# Patient Record
Sex: MALE | Race: BLACK OR AFRICAN AMERICAN | Employment: FULL TIME | URBAN - METROPOLITAN AREA
[De-identification: names, ages, dates, MRNs, and addresses within clinical notes are randomized per-mention and may not be internally consistent; named-entity substitution may affect disease eponyms.]

---

## 2018-03-17 ENCOUNTER — HOSPITAL ENCOUNTER (EMERGENCY)
Age: 23
Discharge: HOME | End: 2018-03-17
Attending: EMERGENCY MEDICINE

## 2018-03-17 VITALS
HEIGHT: 78 IN | HEART RATE: 115 BPM | OXYGEN SATURATION: 96 % | DIASTOLIC BLOOD PRESSURE: 96 MMHG | RESPIRATION RATE: 16 BRPM | SYSTOLIC BLOOD PRESSURE: 159 MMHG | BODY MASS INDEX: 31.24 KG/M2 | WEIGHT: 270 LBS | TEMPERATURE: 98.2 F

## 2018-03-17 DIAGNOSIS — L73.2 HIDRADENITIS SUPPURATIVA: Primary | ICD-10-CM

## 2018-03-17 PROCEDURE — 99283 EMERGENCY DEPT VISIT LOW MDM: CPT

## 2018-03-17 PROCEDURE — 87205 SMEAR GRAM STAIN: CPT

## 2018-03-17 PROCEDURE — 87070 CULTURE OTHR SPECIMN AEROBIC: CPT

## 2018-03-17 RX ORDER — DOXYCYCLINE 100 MG/1
100 TABLET ORAL 2 TIMES DAILY
Qty: 28 TABLET | Refills: 0 | Status: SHIPPED | OUTPATIENT
Start: 2018-03-17 | End: 2018-03-31

## 2018-03-17 ASSESSMENT — PAIN SCALES - GENERAL: PAINLEVEL_OUTOF10: 7

## 2018-03-17 ASSESSMENT — ENCOUNTER SYMPTOMS
NAUSEA: 0
SHORTNESS OF BREATH: 0
ABDOMINAL PAIN: 0
VOMITING: 0

## 2018-03-17 ASSESSMENT — PAIN DESCRIPTION - PAIN TYPE: TYPE: ACUTE PAIN

## 2018-03-17 ASSESSMENT — PAIN DESCRIPTION - FREQUENCY: FREQUENCY: CONTINUOUS

## 2018-03-17 ASSESSMENT — PAIN DESCRIPTION - ORIENTATION: ORIENTATION: RIGHT

## 2018-03-17 ASSESSMENT — PAIN DESCRIPTION - LOCATION: LOCATION: GROIN

## 2018-03-17 ASSESSMENT — PAIN DESCRIPTION - DESCRIPTORS: DESCRIPTORS: BURNING;ITCHING

## 2018-03-17 NOTE — ED PROVIDER NOTES
taking these medications    Details   doxycycline monohydrate (ADOXA) 100 MG tablet Take 1 tablet by mouth 2 times daily for 14 days, Disp-28 tablet, R-0Print               Osbaldo Perez MD  Attending Emergency Physician                      Osbaldo Perez MD  03/17/18 6013

## 2018-03-19 LAB
CULTURE: ABNORMAL
CULTURE: ABNORMAL
DIRECT EXAM: ABNORMAL
Lab: ABNORMAL
SPECIMEN DESCRIPTION: ABNORMAL
SPECIMEN DESCRIPTION: ABNORMAL
STATUS: ABNORMAL

## 2018-03-21 ENCOUNTER — TELEPHONE (OUTPATIENT)
Dept: INTERNAL MEDICINE CLINIC | Age: 23
End: 2018-03-21

## 2019-01-02 ENCOUNTER — HOSPITAL ENCOUNTER (EMERGENCY)
Age: 24
Discharge: HOME | End: 2019-01-02
Attending: EMERGENCY MEDICINE

## 2019-01-02 VITALS
SYSTOLIC BLOOD PRESSURE: 180 MMHG | DIASTOLIC BLOOD PRESSURE: 100 MMHG | BODY MASS INDEX: 33.51 KG/M2 | WEIGHT: 290 LBS | TEMPERATURE: 98.2 F | HEART RATE: 67 BPM | RESPIRATION RATE: 14 BRPM | OXYGEN SATURATION: 98 %

## 2019-01-02 DIAGNOSIS — K08.89 PAIN, DENTAL: Primary | ICD-10-CM

## 2019-01-02 PROCEDURE — 99282 EMERGENCY DEPT VISIT SF MDM: CPT

## 2019-01-02 PROCEDURE — 6370000000 HC RX 637 (ALT 250 FOR IP): Performed by: STUDENT IN AN ORGANIZED HEALTH CARE EDUCATION/TRAINING PROGRAM

## 2019-01-02 RX ORDER — PENICILLIN V POTASSIUM 500 MG/1
500 TABLET ORAL 4 TIMES DAILY
Qty: 28 TABLET | Refills: 0 | Status: SHIPPED | OUTPATIENT
Start: 2019-01-02 | End: 2019-01-09

## 2019-01-02 RX ORDER — IBUPROFEN 800 MG/1
800 TABLET ORAL EVERY 6 HOURS PRN
COMMUNITY

## 2019-01-02 RX ORDER — ACETAMINOPHEN 500 MG
1000 TABLET ORAL ONCE
Status: COMPLETED | OUTPATIENT
Start: 2019-01-02 | End: 2019-01-02

## 2019-01-02 RX ORDER — ACETAMINOPHEN 500 MG
1000 TABLET ORAL EVERY 6 HOURS PRN
Qty: 30 TABLET | Refills: 0 | Status: SHIPPED | OUTPATIENT
Start: 2019-01-02

## 2019-01-02 RX ORDER — IBUPROFEN 800 MG/1
800 TABLET ORAL ONCE
Status: COMPLETED | OUTPATIENT
Start: 2019-01-02 | End: 2019-01-02

## 2019-01-02 RX ORDER — IBUPROFEN 800 MG/1
800 TABLET ORAL EVERY 8 HOURS PRN
Qty: 30 TABLET | Refills: 0 | Status: SHIPPED | OUTPATIENT
Start: 2019-01-02

## 2019-01-02 RX ORDER — PENICILLIN V POTASSIUM 250 MG/1
500 TABLET ORAL ONCE
Status: COMPLETED | OUTPATIENT
Start: 2019-01-02 | End: 2019-01-02

## 2019-01-02 RX ADMIN — PENICILLIN V POTASSIUM 500 MG: 250 TABLET ORAL at 16:47

## 2019-01-02 RX ADMIN — ACETAMINOPHEN 1000 MG: 500 TABLET ORAL at 16:26

## 2019-01-02 RX ADMIN — BENZOCAINE 1 EACH: 220 GEL, DENTIFRICE DENTAL at 16:26

## 2019-01-02 RX ADMIN — IBUPROFEN 800 MG: 800 TABLET ORAL at 16:26

## 2019-01-02 ASSESSMENT — ENCOUNTER SYMPTOMS
VOMITING: 0
ABDOMINAL PAIN: 0
SHORTNESS OF BREATH: 0
NAUSEA: 0

## 2019-01-02 ASSESSMENT — PAIN SCALES - GENERAL: PAINLEVEL_OUTOF10: 10

## 2019-01-02 ASSESSMENT — PAIN DESCRIPTION - ORIENTATION: ORIENTATION: LEFT;LOWER

## 2019-10-16 ENCOUNTER — HOSPITAL ENCOUNTER (EMERGENCY)
Age: 24
Discharge: HOME | End: 2019-10-16
Attending: EMERGENCY MEDICINE

## 2019-10-16 VITALS
HEIGHT: 78 IN | TEMPERATURE: 98.1 F | HEART RATE: 81 BPM | BODY MASS INDEX: 33.55 KG/M2 | SYSTOLIC BLOOD PRESSURE: 151 MMHG | WEIGHT: 290 LBS | OXYGEN SATURATION: 100 % | DIASTOLIC BLOOD PRESSURE: 99 MMHG | RESPIRATION RATE: 17 BRPM

## 2019-10-16 DIAGNOSIS — N30.00 ACUTE CYSTITIS WITHOUT HEMATURIA: Primary | ICD-10-CM

## 2019-10-16 LAB
-: ABNORMAL
AMORPHOUS: ABNORMAL
BACTERIA: ABNORMAL
BILIRUBIN URINE: NEGATIVE
CASTS UA: ABNORMAL /LPF
COLOR: YELLOW
COMMENT UA: ABNORMAL
CRYSTALS, UA: ABNORMAL /HPF
EPITHELIAL CELLS UA: ABNORMAL /HPF
GLUCOSE BLD-MCNC: 88 MG/DL (ref 75–110)
GLUCOSE URINE: NEGATIVE
KETONES, URINE: NEGATIVE
LEUKOCYTE ESTERASE, URINE: NEGATIVE
MUCUS: ABNORMAL
NITRITE, URINE: NEGATIVE
OTHER OBSERVATIONS UA: ABNORMAL
PH UA: 6 (ref 5–8)
PROTEIN UA: NEGATIVE
RBC UA: ABNORMAL /HPF
RENAL EPITHELIAL, UA: ABNORMAL /HPF
SPECIFIC GRAVITY UA: 1.02 (ref 1–1.03)
TRICHOMONAS: ABNORMAL
TURBIDITY: CLEAR
URINE HGB: NEGATIVE
UROBILINOGEN, URINE: NORMAL
WBC UA: ABNORMAL /HPF
YEAST: ABNORMAL

## 2019-10-16 PROCEDURE — 87591 N.GONORRHOEAE DNA AMP PROB: CPT

## 2019-10-16 PROCEDURE — 82947 ASSAY GLUCOSE BLOOD QUANT: CPT

## 2019-10-16 PROCEDURE — 87491 CHLMYD TRACH DNA AMP PROBE: CPT

## 2019-10-16 PROCEDURE — 99283 EMERGENCY DEPT VISIT LOW MDM: CPT

## 2019-10-16 PROCEDURE — 81001 URINALYSIS AUTO W/SCOPE: CPT

## 2019-10-16 ASSESSMENT — ENCOUNTER SYMPTOMS
VOMITING: 0
NAUSEA: 0

## 2019-10-16 ASSESSMENT — PAIN DESCRIPTION - DESCRIPTORS: DESCRIPTORS: BURNING

## 2019-10-16 ASSESSMENT — PAIN SCALES - GENERAL: PAINLEVEL_OUTOF10: 5

## 2019-10-16 ASSESSMENT — PAIN DESCRIPTION - LOCATION: LOCATION: GROIN

## 2019-10-17 LAB
C. TRACHOMATIS DNA ,URINE: NEGATIVE
N. GONORRHOEAE DNA, URINE: NEGATIVE
SPECIMEN DESCRIPTION: NORMAL

## 2023-01-29 ENCOUNTER — APPOINTMENT (OUTPATIENT)
Dept: CT IMAGING | Age: 28
DRG: 205 | End: 2023-01-29
Payer: COMMERCIAL

## 2023-01-29 ENCOUNTER — APPOINTMENT (OUTPATIENT)
Dept: GENERAL RADIOLOGY | Age: 28
DRG: 205 | End: 2023-01-29
Payer: COMMERCIAL

## 2023-01-29 ENCOUNTER — HOSPITAL ENCOUNTER (INPATIENT)
Age: 28
LOS: 2 days | Discharge: HOME | DRG: 205 | End: 2023-01-31
Attending: EMERGENCY MEDICINE | Admitting: INTERNAL MEDICINE
Payer: COMMERCIAL

## 2023-01-29 DIAGNOSIS — I51.7 CARDIOMEGALY: ICD-10-CM

## 2023-01-29 DIAGNOSIS — R07.9 CHEST PAIN, UNSPECIFIED TYPE: Primary | ICD-10-CM

## 2023-01-29 PROBLEM — I42.0 DILATED CARDIOMYOPATHY (HCC): Status: ACTIVE | Noted: 2023-01-29

## 2023-01-29 LAB
ABSOLUTE EOS #: 0.11 K/UL (ref 0–0.4)
ABSOLUTE LYMPH #: 2.22 K/UL (ref 1–4.8)
ABSOLUTE MONO #: 0.68 K/UL (ref 0.1–1.3)
ALBUMIN SERPL-MCNC: 4.2 G/DL (ref 3.5–5.2)
ALP BLD-CCNC: 96 U/L (ref 40–129)
ALT SERPL-CCNC: 28 U/L (ref 5–41)
ANION GAP SERPL CALCULATED.3IONS-SCNC: 9 MMOL/L (ref 9–17)
AST SERPL-CCNC: 25 U/L
BASOPHILS # BLD: 1 % (ref 0–2)
BASOPHILS ABSOLUTE: 0.06 K/UL (ref 0–0.2)
BILIRUB SERPL-MCNC: 0.3 MG/DL (ref 0.3–1.2)
BUN BLDV-MCNC: 11 MG/DL (ref 6–20)
C-REACTIVE PROTEIN: <3 MG/L (ref 0–5)
CALCIUM SERPL-MCNC: 9 MG/DL (ref 8.6–10.4)
CHLORIDE BLD-SCNC: 102 MMOL/L (ref 98–107)
CO2: 27 MMOL/L (ref 20–31)
CREAT SERPL-MCNC: 1 MG/DL (ref 0.7–1.2)
D-DIMER QUANTITATIVE: <0.27 MG/L FEU (ref 0–0.59)
EOSINOPHILS RELATIVE PERCENT: 2 % (ref 0–4)
GFR SERPL CREATININE-BSD FRML MDRD: >60 ML/MIN/1.73M2
GLUCOSE BLD-MCNC: 109 MG/DL (ref 70–99)
HCT VFR BLD CALC: 42.9 % (ref 41–53)
HEMOGLOBIN: 13.8 G/DL (ref 13.5–17.5)
INFLUENZA A: NOT DETECTED
INFLUENZA B: NOT DETECTED
LYMPHOCYTES # BLD: 39 % (ref 24–44)
MAGNESIUM: 1.8 MG/DL (ref 1.6–2.6)
MCH RBC QN AUTO: 23.8 PG (ref 26–34)
MCHC RBC AUTO-ENTMCNC: 32.2 G/DL (ref 31–37)
MCV RBC AUTO: 73.9 FL (ref 80–100)
MONOCYTES # BLD: 12 % (ref 1–7)
MORPHOLOGY: ABNORMAL
PDW BLD-RTO: 14.7 % (ref 11.5–14.9)
PLATELET # BLD: 281 K/UL (ref 150–450)
PMV BLD AUTO: 8.8 FL (ref 6–12)
POTASSIUM SERPL-SCNC: 3.8 MMOL/L (ref 3.7–5.3)
RBC # BLD: 5.81 M/UL (ref 4.5–5.9)
SARS-COV-2 RNA, RT PCR: NOT DETECTED
SEDIMENTATION RATE, ERYTHROCYTE: 15 MM/HR (ref 0–15)
SEG NEUTROPHILS: 46 % (ref 36–66)
SEGMENTED NEUTROPHILS ABSOLUTE COUNT: 2.63 K/UL (ref 1.3–9.1)
SODIUM BLD-SCNC: 138 MMOL/L (ref 135–144)
SOURCE: NORMAL
SPECIMEN DESCRIPTION: NORMAL
THYROXINE, FREE: 0.8 NG/DL (ref 0.93–1.7)
TOTAL CK: 574 U/L (ref 39–308)
TOTAL PROTEIN: 7.8 G/DL (ref 6.4–8.3)
TROPONIN, HIGH SENSITIVITY: 11 NG/L (ref 0–22)
TROPONIN, HIGH SENSITIVITY: 9 NG/L (ref 0–22)
TSH SERPL DL<=0.05 MIU/L-ACNC: 3.42 UIU/ML (ref 0.3–5)
WBC # BLD: 5.7 K/UL (ref 3.5–11)

## 2023-01-29 PROCEDURE — 86140 C-REACTIVE PROTEIN: CPT

## 2023-01-29 PROCEDURE — 86225 DNA ANTIBODY NATIVE: CPT

## 2023-01-29 PROCEDURE — 82550 ASSAY OF CK (CPK): CPT

## 2023-01-29 PROCEDURE — 84439 ASSAY OF FREE THYROXINE: CPT

## 2023-01-29 PROCEDURE — 6370000000 HC RX 637 (ALT 250 FOR IP): Performed by: INTERNAL MEDICINE

## 2023-01-29 PROCEDURE — 86038 ANTINUCLEAR ANTIBODIES: CPT

## 2023-01-29 PROCEDURE — 2580000003 HC RX 258: Performed by: EMERGENCY MEDICINE

## 2023-01-29 PROCEDURE — 87636 SARSCOV2 & INF A&B AMP PRB: CPT

## 2023-01-29 PROCEDURE — 84443 ASSAY THYROID STIM HORMONE: CPT

## 2023-01-29 PROCEDURE — 93005 ELECTROCARDIOGRAM TRACING: CPT | Performed by: EMERGENCY MEDICINE

## 2023-01-29 PROCEDURE — 6370000000 HC RX 637 (ALT 250 FOR IP)

## 2023-01-29 PROCEDURE — 36415 COLL VENOUS BLD VENIPUNCTURE: CPT

## 2023-01-29 PROCEDURE — 85379 FIBRIN DEGRADATION QUANT: CPT

## 2023-01-29 PROCEDURE — 6360000004 HC RX CONTRAST MEDICATION: Performed by: EMERGENCY MEDICINE

## 2023-01-29 PROCEDURE — 74174 CTA ABD&PLVS W/CONTRAST: CPT

## 2023-01-29 PROCEDURE — 85025 COMPLETE CBC W/AUTO DIFF WBC: CPT

## 2023-01-29 PROCEDURE — 99285 EMERGENCY DEPT VISIT HI MDM: CPT

## 2023-01-29 PROCEDURE — 71045 X-RAY EXAM CHEST 1 VIEW: CPT

## 2023-01-29 PROCEDURE — 83735 ASSAY OF MAGNESIUM: CPT

## 2023-01-29 PROCEDURE — 80053 COMPREHEN METABOLIC PANEL: CPT

## 2023-01-29 PROCEDURE — 84484 ASSAY OF TROPONIN QUANT: CPT

## 2023-01-29 PROCEDURE — 85652 RBC SED RATE AUTOMATED: CPT

## 2023-01-29 PROCEDURE — 2580000003 HC RX 258

## 2023-01-29 PROCEDURE — 2060000000 HC ICU INTERMEDIATE R&B

## 2023-01-29 PROCEDURE — 6360000002 HC RX W HCPCS

## 2023-01-29 PROCEDURE — 99223 1ST HOSP IP/OBS HIGH 75: CPT | Performed by: INTERNAL MEDICINE

## 2023-01-29 RX ORDER — SODIUM CHLORIDE 9 MG/ML
INJECTION, SOLUTION INTRAVENOUS PRN
Status: DISCONTINUED | OUTPATIENT
Start: 2023-01-29 | End: 2023-01-31 | Stop reason: HOSPADM

## 2023-01-29 RX ORDER — LOSARTAN POTASSIUM 50 MG/1
50 TABLET ORAL DAILY
Status: DISCONTINUED | OUTPATIENT
Start: 2023-01-29 | End: 2023-01-31 | Stop reason: HOSPADM

## 2023-01-29 RX ORDER — POLYETHYLENE GLYCOL 3350 17 G/17G
17 POWDER, FOR SOLUTION ORAL DAILY PRN
Status: DISCONTINUED | OUTPATIENT
Start: 2023-01-29 | End: 2023-01-31 | Stop reason: HOSPADM

## 2023-01-29 RX ORDER — ACETAMINOPHEN 650 MG/1
650 SUPPOSITORY RECTAL EVERY 6 HOURS PRN
Status: DISCONTINUED | OUTPATIENT
Start: 2023-01-29 | End: 2023-01-31 | Stop reason: HOSPADM

## 2023-01-29 RX ORDER — SODIUM CHLORIDE 0.9 % (FLUSH) 0.9 %
5-40 SYRINGE (ML) INJECTION PRN
Status: DISCONTINUED | OUTPATIENT
Start: 2023-01-29 | End: 2023-01-31 | Stop reason: HOSPADM

## 2023-01-29 RX ORDER — ENOXAPARIN SODIUM 100 MG/ML
30 INJECTION SUBCUTANEOUS 2 TIMES DAILY
Status: DISCONTINUED | OUTPATIENT
Start: 2023-01-29 | End: 2023-01-31 | Stop reason: HOSPADM

## 2023-01-29 RX ORDER — AMLODIPINE BESYLATE 5 MG/1
5 TABLET ORAL DAILY
Status: DISCONTINUED | OUTPATIENT
Start: 2023-01-29 | End: 2023-01-31 | Stop reason: HOSPADM

## 2023-01-29 RX ORDER — IBUPROFEN 800 MG/1
800 TABLET ORAL
Status: DISCONTINUED | OUTPATIENT
Start: 2023-01-29 | End: 2023-01-31 | Stop reason: HOSPADM

## 2023-01-29 RX ORDER — SODIUM CHLORIDE 0.9 % (FLUSH) 0.9 %
5-40 SYRINGE (ML) INJECTION EVERY 12 HOURS SCHEDULED
Status: DISCONTINUED | OUTPATIENT
Start: 2023-01-29 | End: 2023-01-31 | Stop reason: HOSPADM

## 2023-01-29 RX ORDER — SODIUM CHLORIDE 0.9 % (FLUSH) 0.9 %
10 SYRINGE (ML) INJECTION PRN
Status: DISCONTINUED | OUTPATIENT
Start: 2023-01-29 | End: 2023-01-31 | Stop reason: HOSPADM

## 2023-01-29 RX ORDER — 0.9 % SODIUM CHLORIDE 0.9 %
100 INTRAVENOUS SOLUTION INTRAVENOUS ONCE
Status: COMPLETED | OUTPATIENT
Start: 2023-01-29 | End: 2023-01-29

## 2023-01-29 RX ORDER — POTASSIUM CHLORIDE 20 MEQ/1
40 TABLET, EXTENDED RELEASE ORAL PRN
Status: DISCONTINUED | OUTPATIENT
Start: 2023-01-29 | End: 2023-01-31 | Stop reason: HOSPADM

## 2023-01-29 RX ORDER — ONDANSETRON 4 MG/1
4 TABLET, ORALLY DISINTEGRATING ORAL EVERY 8 HOURS PRN
Status: DISCONTINUED | OUTPATIENT
Start: 2023-01-29 | End: 2023-01-31 | Stop reason: HOSPADM

## 2023-01-29 RX ORDER — POTASSIUM CHLORIDE 7.45 MG/ML
10 INJECTION INTRAVENOUS PRN
Status: DISCONTINUED | OUTPATIENT
Start: 2023-01-29 | End: 2023-01-31 | Stop reason: HOSPADM

## 2023-01-29 RX ORDER — ONDANSETRON 2 MG/ML
4 INJECTION INTRAMUSCULAR; INTRAVENOUS EVERY 6 HOURS PRN
Status: DISCONTINUED | OUTPATIENT
Start: 2023-01-29 | End: 2023-01-31 | Stop reason: HOSPADM

## 2023-01-29 RX ORDER — SPIRONOLACTONE 25 MG/1
25 TABLET ORAL DAILY
Status: DISCONTINUED | OUTPATIENT
Start: 2023-01-29 | End: 2023-01-31 | Stop reason: HOSPADM

## 2023-01-29 RX ORDER — ACETAMINOPHEN 325 MG/1
650 TABLET ORAL EVERY 6 HOURS PRN
Status: DISCONTINUED | OUTPATIENT
Start: 2023-01-29 | End: 2023-01-31 | Stop reason: HOSPADM

## 2023-01-29 RX ADMIN — SODIUM CHLORIDE, PRESERVATIVE FREE 10 ML: 5 INJECTION INTRAVENOUS at 20:11

## 2023-01-29 RX ADMIN — IBUPROFEN 800 MG: 800 TABLET ORAL at 17:22

## 2023-01-29 RX ADMIN — ENOXAPARIN SODIUM 30 MG: 100 INJECTION SUBCUTANEOUS at 12:49

## 2023-01-29 RX ADMIN — SODIUM CHLORIDE, PRESERVATIVE FREE 10 ML: 5 INJECTION INTRAVENOUS at 08:56

## 2023-01-29 RX ADMIN — IOPAMIDOL 100 ML: 755 INJECTION, SOLUTION INTRAVENOUS at 08:56

## 2023-01-29 RX ADMIN — SODIUM CHLORIDE, PRESERVATIVE FREE 10 ML: 5 INJECTION INTRAVENOUS at 11:29

## 2023-01-29 RX ADMIN — SODIUM CHLORIDE 100 ML: 9 INJECTION, SOLUTION INTRAVENOUS at 08:57

## 2023-01-29 RX ADMIN — SPIRONOLACTONE 25 MG: 25 TABLET ORAL at 17:39

## 2023-01-29 RX ADMIN — LOSARTAN POTASSIUM 50 MG: 50 TABLET, FILM COATED ORAL at 17:22

## 2023-01-29 RX ADMIN — AMLODIPINE BESYLATE 5 MG: 5 TABLET ORAL at 17:22

## 2023-01-29 RX ADMIN — ENOXAPARIN SODIUM 30 MG: 100 INJECTION SUBCUTANEOUS at 21:39

## 2023-01-29 RX ADMIN — IBUPROFEN 800 MG: 800 TABLET ORAL at 12:49

## 2023-01-29 ASSESSMENT — PAIN DESCRIPTION - LOCATION: LOCATION: CHEST

## 2023-01-29 ASSESSMENT — ENCOUNTER SYMPTOMS
VOMITING: 0
COUGH: 0
NAUSEA: 0
SHORTNESS OF BREATH: 0
ABDOMINAL PAIN: 0
HEARTBURN: 0
COUGH: 1
WHEEZING: 0
BACK PAIN: 0

## 2023-01-29 ASSESSMENT — PAIN DESCRIPTION - ORIENTATION: ORIENTATION: RIGHT

## 2023-01-29 ASSESSMENT — LIFESTYLE VARIABLES
HOW OFTEN DO YOU HAVE A DRINK CONTAINING ALCOHOL: NEVER
HOW MANY STANDARD DRINKS CONTAINING ALCOHOL DO YOU HAVE ON A TYPICAL DAY: PATIENT DOES NOT DRINK

## 2023-01-29 ASSESSMENT — PAIN SCALES - GENERAL: PAINLEVEL_OUTOF10: 5

## 2023-01-29 ASSESSMENT — PAIN DESCRIPTION - DESCRIPTORS: DESCRIPTORS: CRAMPING

## 2023-01-29 NOTE — ED TRIAGE NOTES
Mode of arrival (squad #, walk in, police, etc) : walk in        Chief complaint(s): chest pain        Arrival Note (brief scenario, treatment PTA, etc). : Pt arrives to ED c/o chest pain. Pt states he had it two weeks ago but it went away all last week but has been back for 2-3 days. Pt states it is a constant pain in the middle of his chest. Pt states he was sick a little bit ago because his furnace went out. Pt is A&Ox4, eupneic, PWD. GCS=15. Call light in reach. C= \"Have you ever felt that you should Cut down on your drinking? \"  No  A= \"Have people Annoyed you by criticizing your drinking? \"  No  G= \"Have you ever felt bad or Guilty about your drinking? \"  No  E= \"Have you ever had a drink as an Eye-opener first thing in the morning to steady your nerves or to help a hangover? \"  No      Deferred []      Reason for deferring: N/A    *If yes to two or more: probable alcohol abuse. *

## 2023-01-29 NOTE — CONSULTS
Date:   1/29/2023  Patient name: Mikel Nyhan  Date of admission:  1/29/2023  6:48 AM  MRN:   [id number]  YOB: 1995  PCP: No primary care provider on file. Reason for Admission: Cardiomegaly [I51.7]  Chest pain, unspecified type [R07.9]    Cardiology consult       Referring physician: Dr Bethany Crook    Impression  Hypertension, uncontrolled, patient was not on any medication  Cardiomegaly no pericardial effusion  Normal thoracic and abdominal aorta and branches  Morbid obesity BMI 36  Normal renal function  Low MCV    ECG 1/29/2023  Sinus rhythm heart rate 70, right axis, minimal voltage criteria for LVH, nonspecific T wave changes lead III, aVF and V6    CTA chest, abdomen and pelvis with contrast 1/29/2023  Cardiomegaly  No evidence of thoracic aortic aneurysm or dissection  No pulmonary embolism, no pericardial effusion no pneumothorax or pleural effusion  Normal abdominal aorta and branches  Hepatic asteatosis, kidneys are grossly unremarkable  Peritoneum/retroperitoneum unremarkable    History of present illness    67-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 No fever no chills no productive cough no wheezing. No history of syncope. At work he is doing very heavy duty work without any problem.     On admission blood pressure was 176/106, temperature 98, oxygen saturation 97%  Lab work on admission  Total CK5 74, high-sensitivity troponin 9  Sodium 138, potassium 3.8, BUN 11, creatinine 1.00, glucose 109, calcium 9.0  TSH 3.42, free thyroxine 0.80  Hemoglobin 13.8, WBC 5.7, platelets 321, MCV 74  Influenza a and B and COVID-19 not detected    Current evaluation  Patient seen and examined  Sherrie Kinney male resting on bed did not appear in any distress  Afebrile  No obvious sign of cardiopulmonary decompensation    Blood pressure 160/90, heart rate 63, temperature 98.1, oxygen saturation 98%    Medications:   Scheduled Meds:   ibuprofen  800 mg Oral TID WC    sodium chloride flush  5-40 mL IntraVENous 2 times per day    enoxaparin  30 mg SubCUTAneous BID     Continuous Infusions:   sodium chloride       CBC:   Recent Labs     01/29/23  0709   WBC 5.7   HGB 13.8        BMP:    Recent Labs     01/29/23  0709      K 3.8      CO2 27   BUN 11   CREATININE 1.00   GLUCOSE 109*     Hepatic:   Recent Labs     01/29/23  0709   AST 25   ALT 28   BILITOT 0.3   ALKPHOS 96     Troponin: No results for input(s): TROPONINI in the last 72 hours. BNP: No results for input(s): BNP in the last 72 hours. Lipids: No results for input(s): CHOL, HDL in the last 72 hours. Invalid input(s): LDLCALCU  INR: No results for input(s): INR in the last 72 hours. Objective:   Vitals: BP (!) 160/89   Pulse 63   Temp 98.1 °F (36.7 °C) (Oral)   Resp 20   Ht 6' 6\" (1.981 m)   Wt (!) 312 lb 9.8 oz (141.8 kg)   SpO2 98%   BMI 36.13 kg/m²   General appearance: alert and cooperative with exam  HEENT: Head: Normal, normocephalic, atraumatic. Neck: no carotid bruit, no JVD, and supple, symmetrical, trachea midline  Lungs: clear to auscultation bilaterally  Heart: regular rate and rhythm  Abdomen:  Soft bowel sounds plus  Extremities: extremities normal, atraumatic, no cyanosis or edema  Neurologic: Mental status: Alert, oriented, thought content appropriate    EKG: Sinus rhythm, right axis, minimal voltage criteria for LVH. ECHO: ordered, but not yet obtained.      Assessment / Acute Cardiac Problems:   Atypical chest pain  Negative troponin  Hypertension  Cardiomegaly  No obvious sign of cardiopulmonary decompensation    Patient Active Problem List:     Cardiomegaly      Plan of Treatment:   Medications reviewed  Add Norvasc  5 mg a day, Aldactone 25 mg a day  Add losartan 50 mg a day  2D echo tomorrow  BMP tomorrow      Electronically signed by Sravan Arellano MD on 1/29/2023 at 2:12 PM

## 2023-01-29 NOTE — ED PROVIDER NOTES
[facility]    Pt Name: Julio C Khoury  MRN: [id number]  [place] 1995  Date of evaluation: 1/29/23  CHIEF COMPLAINT       Chief Complaint   Patient presents with    Chest Pain     HISTORY OF PRESENT ILLNESS     Chest Pain  Pain location:  R chest  Pain quality: aching    Pain radiates to:  Does not radiate  Pain severity:  Moderate  Onset quality:  Gradual  Duration:  2 minutes  Timing:  Intermittent  Progression:  Waxing and waning  Chronicity:  New  Context comment:  Mild URI symptoms off and on past 2 weeks  Relieved by:  Nothing  Worsened by:  Nothing  Ineffective treatments:  None tried  Associated symptoms: cough    Associated symptoms: no abdominal pain, no back pain, no diaphoresis, no fatigue, no fever, no heartburn, no nausea, no near-syncope, no shortness of breath, no syncope and no vomiting          REVIEW OF SYSTEMS     Review of Systems   Constitutional:  Negative for diaphoresis, fatigue and fever. Respiratory:  Positive for cough. Negative for shortness of breath. Cardiovascular:  Positive for chest pain. Negative for syncope and near-syncope. Gastrointestinal:  Negative for abdominal pain, heartburn, nausea and vomiting. Musculoskeletal:  Negative for back pain. All other systems reviewed and are negative. PASTMEDICAL HISTORY   History reviewed. No pertinent past medical history. Past Problem List  Patient Active Problem List   Diagnosis Code    Cardiomegaly I51.7    Dilated cardiomyopathy ([facility] [address] idiopathic I42.0     SURGICAL HISTORY     History reviewed. No pertinent surgical history.   CURRENT MEDICATIONS       Current Discharge Medication List        CONTINUE these medications which have NOT CHANGED    Details   !! ibuprofen (ADVIL;MOTRIN) 800 MG tablet Take 800 mg by mouth every 6 hours as needed for Pain      !! ibuprofen (ADVIL;MOTRIN) 800 MG tablet Take 1 tablet by mouth every 8 hours as needed for Pain  Qty: 30 tablet, Refills: 0      acetaminophen (TYLENOL) 500 MG tablet Take 2 tablets by mouth every 6 hours as needed for Pain  Qty: 30 tablet, Refills: 0      benzocaine (ORAJEL) 10 % mucosal gel Take by mouth as needed. Qty: 1 Tube, Refills: 0       !! - Potential duplicate medications found. Please discuss with provider. ALLERGIES     has No Known Allergies. FAMILY HISTORY     has no family status information on file. SOCIAL HISTORY       Social History     Tobacco Use    Smoking status: Never    Smokeless tobacco: Never   Vaping Use    Vaping Use: Never used   Substance Use Topics    Alcohol use: No    Drug use: No     PHYSICAL EXAM     INITIAL VITALS: BP (!) 160/89   Pulse 63   Temp 98.1 °F (36.7 °C) (Oral)   Resp 20   Ht 6' 6\" (1.981 m)   Wt (!) 312 lb 9.8 oz (141.8 kg)   SpO2 98%   BMI 36.13 kg/m²    Physical Exam  Constitutional:       General: He is not in acute distress. Appearance: Normal appearance. He is well-developed. He is not diaphoretic. HENT:      Head: Normocephalic and atraumatic. Right Ear: External ear normal.      Left Ear: External ear normal.      Nose: Nose normal. No congestion. Mouth/Throat:      Mouth: Mucous membranes are moist.      Pharynx: Oropharynx is clear. Eyes:      General:         Right eye: No discharge. Left eye: No discharge. Conjunctiva/sclera: Conjunctivae normal.      Pupils: Pupils are equal, round, and reactive to light. Neck:      Trachea: No tracheal deviation. Cardiovascular:      Rate and Rhythm: Normal rate and regular rhythm. Pulses: Normal pulses. Heart sounds: Normal heart sounds. Comments: Radial pulses 2+ BL  Pulmonary:      Effort: Pulmonary effort is normal. No respiratory distress. Breath sounds: Normal breath sounds. No stridor. No wheezing or rales. Abdominal:      Palpations: Abdomen is soft. Tenderness: There is no abdominal tenderness. There is no guarding or rebound.    Musculoskeletal:         General: No tenderness or deformity. Normal range of motion. Cervical back: Normal range of motion and neck supple. Skin:     General: Skin is warm and dry. Capillary Refill: Capillary refill takes less than 2 seconds. Findings: No erythema or rash. Neurological:      General: No focal deficit present. Mental Status: He is alert and oriented to person, place, and time. Coordination: Coordination normal.   Psychiatric:         Mood and Affect: Mood normal.         Behavior: Behavior normal.         Thought Content: Thought content normal.         Judgment: Judgment normal.       MEDICAL DECISION MAKING / ED COURSE:         1)  Number and Complexity of Problems Addressed at this Encounter  Problem List This Visit: chest pain    Differential Diagnosis: ami, pe, ms pain    Diagnoses Considered but Do Not Suspect:  AD, ACS    Pertinent Comorbid Conditions:  none    2)  Data Reviewed and Analyzed  (Lab and radiology tests/orders below in next section)    External Documents Reviewed:  none    My EKG interpretation:  NSR, nonspecific changes, LVH changes, no acute ischemic changes on ST segments, no old, normal rate and normal intervals except qrs 102       Tests considered but not ordered and why:  Sergio Turk PE Criteria:    []YES  [x]NO :Physical findings suggestive of DVT (unilateral leg swelling, calf or thigh tenderness) (3 points if yes)  []YES  [x]NO :No alternative diagnosis better explains the illness (3 points if yes)  []YES  [x]NO :Tachycardia with pulse >100 (1.5 points if yes)  []YES  [x]NO :Immobilization (?3 days) or surgery in the previous four weeks (1.5 points if yes)  []YES  [x]NO :Prior history of DVT or PE (1.5 points if yes)  []YES  [x]NO :Presence of hemoptysis (1 point if yes)  []YES  [x]NO :Presence of malignancy (1 point if yes)    Wells PE Score Criteria Below TOTAL =  0    If Wells score is 2 or less, then d-dimer testing is recommended.     Ddimer neg, do not suspect PE    3)  Treatment and Disposition    ED Course as of 01/29/23 1603   Sun Jan 29, 2023   0824 Abnormal CXR showing CM and possible valvular disease, checking CTA chest [WM]   7978 DW [facility] for admission [WM]   0958 DW Dr Jaylin Galaviz for admit [WM]      ED Course User Index  [WM] Yaa Daily MD       Patient repeat assessment:  unchanged    Disposition discussion with patient/family, Shared Decision Making:  admit    Case discussed with consulting clinician:  Dr Jaylin Galaviz and [facility] residents    Admitting for echo    DATA FOR LAB AND RADIOLOGY TESTS ORDERED BELOW ARE REVIEWED BY THE ED CLINICIAN:    RADIOLOGY: All x-rays, CT, MRI, and formal ultrasound images (except ED bedside ultrasound) are read by the radiologist, see reports below, unless otherwise noted in MDM or here. Reports below are reviewed by myself. CTA CHEST ABDOMEN PELVIS W CONTRAST   Final Result   1. No aortic aneurysm, dissection, or occlusion. No major branch   abnormalities. 2.  Hepatic steatosis. 3.  Cardiomegaly. No pericardial effusion. RECOMMENDATIONS:   Advise echocardiography. Assess for myocardial inflammatory process. Current troponin levels are normal.         XR CHEST PORTABLE   Final Result   No focal consolidation. The patient has cardiomegaly and a prominent right   atrial contour of concern for possible valvular disease. RECOMMENDATION:   Echocardiography advised. LABS: Lab orders shown below, the results are reviewed by myself, and all abnormals are listed below. Labs Reviewed   CBC WITH AUTO DIFFERENTIAL - Abnormal; Notable for the following components:       Result Value    MCV 73.9 (*)     MCH 23.8 (*)     Monocytes 12 (*)     All other components within normal limits   COMPREHENSIVE METABOLIC PANEL - Abnormal; Notable for the following components:    Glucose 109 (*)     All other components within normal limits   CK - Abnormal; Notable for the following components:     Total  (*)     All other components within normal limits   T4, FREE - Abnormal; Notable for the following components:    Thyroxine, Free 0.80 (*)     All other components within normal limits   COVID-19 & INFLUENZA COMBO   MAGNESIUM   TROPONIN   D-DIMER, QUANTITATIVE   TROPONIN   C-REACTIVE PROTEIN   TSH   SEDIMENTATION RATE    SCREEN WITH REFLEX       Vitals Reviewed:    Vitals:    01/29/23 1056 01/29/23 1105 01/29/23 1130 01/29/23 1215   BP: (!) 144/75  (!) 168/97 (!) 160/89   Pulse: 70 67 73 63   Resp: 18  18 20   Temp: 97.9 °F (36.6 °C)  98.1 °F (36.7 °C) 98.1 °F (36.7 °C)   TempSrc: Oral  Oral Oral   SpO2: 99%  99% 98%   Weight:    (!) 312 lb 9.8 oz (141.8 kg)   Height:    6' 6\" (1.981 m)     MEDICATIONS GIVEN TO PATIENT THIS ENCOUNTER:  Orders Placed This Encounter   Medications    sodium chloride flush 0.9 % injection 10 mL    0.9 % sodium chloride bolus    iopamidol (ISOVUE-370) 76 % injection 100 mL    ibuprofen (ADVIL;MOTRIN) tablet 800 mg    sodium chloride flush 0.9 % injection 5-40 mL    sodium chloride flush 0.9 % injection 5-40 mL    0.9 % sodium chloride infusion    enoxaparin Sodium (LOVENOX) injection 30 mg     Order Specific Question:   Indication of Use     Answer:   Prophylaxis-DVT/PE    OR Linked Order Group     ondansetron (ZOFRAN-ODT) disintegrating tablet 4 mg     ondansetron (ZOFRAN) injection 4 mg    polyethylene glycol (GLYCOLAX) packet 17 g    OR Linked Order Group     acetaminophen (TYLENOL) tablet 650 mg     acetaminophen (TYLENOL) suppository 650 mg    OR Linked Order Group     potassium chloride (KLOR-CON M) extended release tablet 40 mEq     potassium bicarb-citric acid (EFFER-K) effervescent tablet 40 mEq     potassium chloride 10 mEq/100 mL IVPB (Peripheral Line)    spironolactone (ALDACTONE) tablet 25 mg    losartan (COZAAR) tablet 50 mg    amLODIPine (NORVASC) tablet 5 mg     DISCHARGE PRESCRIPTIONS:  Current Discharge Medication List        PHYSICIAN CONSULTS ORDERED THIS ENCOUNTER:  IP CONSULT TO SOCIAL WORK  IP CONSULT TO CARDIOLOGY  FINAL IMPRESSION      1. Chest pain, unspecified type    2. Cardiomegaly          DISPOSITION/PLAN   DISPOSITION Admitted 01/29/2023 09:57:43 AM      OUTPATIENT FOLLOW UP THE PATIENT:  No follow-up provider specified.     MD April Cassidy MD  01/29/23 8810

## 2023-01-29 NOTE — PROGRESS NOTES
Pt arrived to floor via stretcher from ED and was transfered to bed. Vitals taken, telemetry applied. Assessment complete. No distress noted. See doc flowsheet and admission navigator for details. POC and education initiated and reviewed with patient. Call light within reach, and pt educated on its use. Bed in lowest position, and locked. Side rails up x 2. Denied further questions or needs at this time. Will continue to monitor. 2/10 R sided chest pain at this time. Denies need for anything at this time.

## 2023-01-29 NOTE — LETTER
[address] [facility]  [address]  Phone: [phone]      January 31, 2023    Julio C Alexanderl  [address] [place] [address] [address] [address]      To whom it may concern:    Mr. Isrrael Galvez was admitted to the hospital on 01/29/2023, and will be discharged on 01/31/2023. Mr. Isrrael Galvez is advised to have lighter duties of work until Monday, 02/06/2023. If you have any questions or concerns, please don't hesitate to call.     Sincerely,    Electronically signed by Maribel Howard MD on 1/31/23 at 8:48 AM EST

## 2023-01-29 NOTE — PROGRESS NOTES
Physical Therapy        Physical Therapy Cancel Note      DATE: 2023    NAME: Edgar Hendrickson  MRN: [id number]   : 1995      Patient not seen this date for Physical Therapy due to:    Patient at baseline functional level with no acute PT needs. Will defer PT evaluation at this time. Please reorder PT if future needs arise. Pt demonstrates safe and independent mobility in the room.      Electronically signed by Emely Bush PT on 2023 at 3:29 PM

## 2023-01-29 NOTE — H&P
[address]     HISTORY AND PHYSICAL EXAMINATION            Date:   1/29/2023  Patient name:  Verena Baeza  Date of admission:  1/29/2023  6:48 AM  MRN:   [id number]  Account:  [de-identified]  YOB: 1995  PCP:    No primary care provider on file. Room:   08/08  Code Status:    No Order    Chief Complaint:     Chief Complaint   Patient presents with    Chest Pain       History Obtained From:     patient    History of Present Illness: The patient is a 28 y.o. Non- / non  male who presents withChest Pain   and he is admitted to the hospital for the management of  cardiomegaly on imaging. Patient is a 79-year-old -American male who complains of right-sided chest pain that started 2 weeks ago. The chest pain does not radiate. The pain comes and goes and it would go away on its own and come back on its own. He is not currently experiencing excruciating pain at the moment. States that the pain is worse with lying down is better with leaning forward. Patient also expresses that 11 years ago, he experienced similar chest pain when he was used to be playing vigorous basketball. He does not play as much as he does now. His family history is significant for an uncle who had heart attack \"a long time ago\". Patient does not have any past medical history or comorbidities. He denies any alcohol, smoking or illicit drug use. In the ER patient had a CTA which showed cardiomegaly. His chest x-ray showed cardiomegaly and a prominent right atrial contour of concern for possible valvular disease. We will order an echo to rule out possible pericarditis versus valvular disease/cardiomyopathy. Will start patient on ibuprofen for pain control. Past Medical History:     History reviewed. No pertinent past medical history. Past SurgicalHistory:     History reviewed. No pertinent surgical history. Medications Prior to Admission:        Prior to Admission medications    Medication Sig Start Date End Date Taking? Authorizing Provider   ibuprofen (ADVIL;MOTRIN) 800 MG tablet Take 800 mg by mouth every 6 hours as needed for Pain    Historical Provider, MD   ibuprofen (ADVIL;MOTRIN) 800 MG tablet Take 1 tablet by mouth every 8 hours as needed for Pain 19   Alexey Carroll DO   acetaminophen (TYLENOL) 500 MG tablet Take 2 tablets by mouth every 6 hours as needed for Pain 19   Alexey Carroll, DO   benzocaine (ORAJEL) 10 % mucosal gel Take by mouth as needed. 19   Alexey Fall-DO Pia        Allergies:     Patient has no known allergies. Social History:     Tobacco:    reports that he has never smoked. He has never used smokeless tobacco.  Alcohol:      reports no history of alcohol use. Drug Use:  reports no history of drug use. Family History:     History reviewed. No pertinent family history. Review of Systems:     Positive and Negative as described in HPI. Review of Systems   Constitutional:  Negative for chills and fever. Respiratory:  Negative for cough, shortness of breath and wheezing. Cardiovascular:  Positive for chest pain. Negative for palpitations and leg swelling. Gastrointestinal:  Negative for abdominal pain. Genitourinary:  Negative for dysuria. Neurological:  Negative for dizziness, syncope, weakness, light-headedness and headaches. Psychiatric/Behavioral:  Negative for confusion. Physical Exam:   /81   Pulse 60   Temp 98 °F (36.7 °C)   Resp 18   Ht 6' 6\" (1.981 m)   Wt (!) 315 lb (142.9 kg)   SpO2 98%   BMI 36.40 kg/m²   Temp (24hrs), Av °F (36.7 °C), Min:98 °F (36.7 °C), Max:98 °F (36.7 °C)    No results for input(s): POCGLU in the last 72 hours.   No intake or output data in the 24 hours ending 23 1023    Physical Exam  Constitutional:       General: He is not in acute distress. Appearance: He is not ill-appearing. Cardiovascular:      Rate and Rhythm: Normal rate and regular rhythm. Pulses: Normal pulses. Heart sounds: Normal heart sounds. Pulmonary:      Effort: Pulmonary effort is normal.      Breath sounds: Normal breath sounds. Abdominal:      Palpations: Abdomen is soft. Musculoskeletal:      Right lower leg: No edema. Left lower leg: No edema. Skin:     General: Skin is warm. Neurological:      Mental Status: He is alert and oriented to person, place, and time.        Investigations:     Laboratory Testing:  Recent Results (from the past 24 hour(s))   EKG 12 Lead    Collection Time: 01/29/23  7:01 AM   Result Value Ref Range    Ventricular Rate 70 BPM    Atrial Rate 70 BPM    P-R Interval 170 ms    QRS Duration 102 ms    Q-T Interval 388 ms    QTc Calculation (Bazett) 419 ms    P Axis 31 degrees    R Axis 105 degrees    T Axis -4 degrees   CBC with Auto Differential    Collection Time: 01/29/23  7:09 AM   Result Value Ref Range    WBC 5.7 3.5 - 11.0 k/uL    RBC 5.81 4.5 - 5.9 m/uL    Hemoglobin 13.8 13.5 - 17.5 g/dL    Hematocrit 42.9 41 - 53 %    MCV 73.9 (L) 80 - 100 fL    MCH 23.8 (L) 26 - 34 pg    MCHC 32.2 31 - 37 g/dL    RDW 14.7 11.5 - 14.9 %    Platelets 488 725 - 909 k/uL    MPV 8.8 6.0 - 12.0 fL    Seg Neutrophils 46 36 - 66 %    Lymphocytes 39 24 - 44 %    Monocytes 12 (H) 1 - 7 %    Eosinophils % 2 0 - 4 %    Basophils 1 0 - 2 %    Segs Absolute 2.63 1.3 - 9.1 k/uL    Absolute Lymph # 2.22 1.0 - 4.8 k/uL    Absolute Mono # 0.68 0.1 - 1.3 k/uL    Absolute Eos # 0.11 0.0 - 0.4 k/uL    Basophils Absolute 0.06 0.0 - 0.2 k/uL    Morphology ANISOCYTOSIS PRESENT     Morphology HYPOCHROMIA PRESENT     Morphology MICROCYTOSIS PRESENT    Comprehensive Metabolic Panel    Collection Time: 01/29/23  7:09 AM   Result Value Ref Range    Glucose 109 (H) 70 - 99 mg/dL    BUN 11 6 - 20 mg/dL    Creatinine 1.00 0.70 - 1.20 mg/dL Est, Glom Filt Rate >60 >60 mL/min/1.73m2    Calcium 9.0 8.6 - 10.4 mg/dL    Sodium 138 135 - 144 mmol/L    Potassium 3.8 3.7 - 5.3 mmol/L    Chloride 102 98 - 107 mmol/L    CO2 27 20 - 31 mmol/L    Anion Gap 9 9 - 17 mmol/L    Alkaline Phosphatase 96 40 - 129 U/L    ALT 28 5 - 41 U/L    AST 25 <40 U/L    Total Bilirubin 0.3 0.3 - 1.2 mg/dL    Total Protein 7.8 6.4 - 8.3 g/dL    Albumin 4.2 3.5 - 5.2 g/dL   Magnesium    Collection Time: 01/29/23  7:09 AM   Result Value Ref Range    Magnesium 1.8 1.6 - 2.6 mg/dL   Troponin    Collection Time: 01/29/23  7:09 AM   Result Value Ref Range    Troponin, High Sensitivity 11 0 - 22 ng/L   CK    Collection Time: 01/29/23  7:09 AM   Result Value Ref Range    Total  (H) 39 - 308 U/L   D-Dimer, Quantitative    Collection Time: 01/29/23  7:33 AM   Result Value Ref Range    D-Dimer, Quant <0.27 0.00 - 0.59 mg/L FEU       Imaging/Diagnostics:  XR CHEST PORTABLE    Result Date: 1/29/2023  EXAMINATION: ONE XRAY VIEW OF THE CHEST 1/29/2023 7:36 am COMPARISON: None. HISTORY: ORDERING SYSTEM PROVIDED HISTORY: cp TECHNOLOGIST PROVIDED HISTORY:  Reason for Exam: Chest pain on/off x 2 weeks FINDINGS: AP portable view of the chest time stamped at 754 hours demonstrates overlying cardiac monitoring electrodes. The patient has cardiomegaly, atypical for age. No vascular congestion, focal consolidation, effusion or pneumothorax is noted. Right atrial contour is prominent. No focal consolidation. The patient has cardiomegaly and a prominent right atrial contour of concern for possible valvular disease. RECOMMENDATION: Echocardiography advised. CTA CHEST ABDOMEN PELVIS W CONTRAST    1. No aortic aneurysm, dissection, or occlusion. No major branch abnormalities. 2.  Hepatic steatosis. 3.  Cardiomegaly. No pericardial effusion. RECOMMENDATIONS: Advise echocardiography. Assess for myocardial inflammatory process.  Current troponin levels are normal.       Assessment : Primary Problem  Cardiomegaly    Active Hospital Problems    Diagnosis Date Noted    Cardiomegaly [I51.7] 01/29/2023     Priority: Medium       Plan:     Patient status Admit as inpatient in the  Progressive Unit/Step down    Chest pain with findings of cardiomegaly on imaging, r/o pericarditis vs valvular disease/cardiomyopathies   -X-ray shows cardiomegaly and a prominent right atrial contour of concern for possible valvular disease  -CTA shows cardiomegaly  -Patient has similar episode 11 years ago while playing basketball. At that time CT angiogram and lung scan were unremarkable  -First troponin in ER was normal, second troponin ordered. Pending  -CK was elevated  -D-dimer normal  -flu/covid negative  -We will order ECHO for further assessment and consult cardiology  -will also order tsh/t4, sed rate, crp and  w/reflex   -We will give 800 mg ibuprofen 3 times daily    DVT prophylaxis: lovenox 40mg sub/c  GI prophylaxis: not indicated at this time  Diet: regular  Disposition: admit to progressive     OT/PT/SW    Code Status: full    Consultations:   IP CONSULT TO SOCIAL WORK     Patient is admitted as inpatient status because of co-morbiditieslisted above, severity of signs and symptoms as outlined, requirement for current medical therapies and most importantly because of direct risk to patient if care not provided in a hospital setting. Washington Tobias MD  1/29/2023  10:23 AM    Attestation and add on       I have discussed the care of Cortney Segundo , including pertinent history and exam findings,      1/29/23    with the resident. I have seen and examined the patient and the key elements of all parts of the encounter have been performed by me . I agree with the assessment, plan and orders as documented by the resident.      Has had dilated cardiomyopathy for the last 10 years or more  Possibility of collagen vascular disease needs to be ruled out  Will get  with reflex sed rate CRP  Cardiology consulted  Patient has not been following with anybody in a consistent manner for long time  Work-up ordered     MD TAMARA Espinoza[address], [address] [place] [address]. Phone [phone]   Fax: [phone]  Answering Service: [phone]            Copy sent to Dr. Horner primary care provider on file.

## 2023-01-29 NOTE — LETTER
[facility]  [address]  Phone: [phone]      January 31, 2023    [address] [address]      To whom it may concern:    Mr. Zeina Winchester was admitted to the hospital on 01/29/2023, and will be discharged on 01/31/2023. Mr. Zeina Winchester is advised to have lighter duties of work until Monday, 02/06/2023. If you have any questions or concerns, please don't hesitate to call.     Sincerely,    Electronically signed by Krysta Pleitez MD on 1/31/23 at 8:48 AM EST

## 2023-01-30 ENCOUNTER — APPOINTMENT (OUTPATIENT)
Dept: NON INVASIVE DIAGNOSTICS | Age: 28
DRG: 205 | End: 2023-01-30
Payer: COMMERCIAL

## 2023-01-30 LAB
ABSOLUTE EOS #: 0.1 K/UL (ref 0–0.4)
ABSOLUTE LYMPH #: 2.2 K/UL (ref 1–4.8)
ABSOLUTE MONO #: 0.8 K/UL (ref 0.1–1.3)
ANION GAP SERPL CALCULATED.3IONS-SCNC: 10 MMOL/L (ref 9–17)
ANTI DNA DOUBLE STRANDED: <0.5 IU/ML
ANTI-NUCLEAR ANTIBODY (ANA): NEGATIVE
BASOPHILS # BLD: 0 % (ref 0–2)
BASOPHILS ABSOLUTE: 0 K/UL (ref 0–0.2)
BUN BLDV-MCNC: 11 MG/DL (ref 6–20)
CALCIUM SERPL-MCNC: 9.4 MG/DL (ref 8.6–10.4)
CHLORIDE BLD-SCNC: 102 MMOL/L (ref 98–107)
CO2: 25 MMOL/L (ref 20–31)
CREAT SERPL-MCNC: 0.96 MG/DL (ref 0.7–1.2)
ENA ANTIBODIES SCREEN: 0.1 U/ML
EOSINOPHILS RELATIVE PERCENT: 1 % (ref 0–4)
GFR SERPL CREATININE-BSD FRML MDRD: >60 ML/MIN/1.73M2
GLUCOSE BLD-MCNC: 86 MG/DL (ref 70–99)
HCT VFR BLD CALC: 44.2 % (ref 41–53)
HEMOGLOBIN: 14.5 G/DL (ref 13.5–17.5)
LV EF: 55 %
LVEF MODALITY: NORMAL
LYMPHOCYTES # BLD: 35 % (ref 24–44)
MCH RBC QN AUTO: 24.4 PG (ref 26–34)
MCHC RBC AUTO-ENTMCNC: 32.7 G/DL (ref 31–37)
MCV RBC AUTO: 74.7 FL (ref 80–100)
MONOCYTES # BLD: 12 % (ref 1–7)
PDW BLD-RTO: 14.9 % (ref 11.5–14.9)
PLATELET # BLD: 290 K/UL (ref 150–450)
PMV BLD AUTO: 9 FL (ref 6–12)
POTASSIUM SERPL-SCNC: 3.9 MMOL/L (ref 3.7–5.3)
RBC # BLD: 5.92 M/UL (ref 4.5–5.9)
SEG NEUTROPHILS: 52 % (ref 36–66)
SEGMENTED NEUTROPHILS ABSOLUTE COUNT: 3.1 K/UL (ref 1.3–9.1)
SODIUM BLD-SCNC: 137 MMOL/L (ref 135–144)
WBC # BLD: 6.2 K/UL (ref 3.5–11)

## 2023-01-30 PROCEDURE — 2060000000 HC ICU INTERMEDIATE R&B

## 2023-01-30 PROCEDURE — 6360000002 HC RX W HCPCS

## 2023-01-30 PROCEDURE — 2580000003 HC RX 258

## 2023-01-30 PROCEDURE — 6370000000 HC RX 637 (ALT 250 FOR IP): Performed by: INTERNAL MEDICINE

## 2023-01-30 PROCEDURE — 36415 COLL VENOUS BLD VENIPUNCTURE: CPT

## 2023-01-30 PROCEDURE — 6360000004 HC RX CONTRAST MEDICATION: Performed by: INTERNAL MEDICINE

## 2023-01-30 PROCEDURE — C8929 TTE W OR WO FOL WCON,DOPPLER: HCPCS

## 2023-01-30 PROCEDURE — 80048 BASIC METABOLIC PNL TOTAL CA: CPT

## 2023-01-30 PROCEDURE — 6370000000 HC RX 637 (ALT 250 FOR IP)

## 2023-01-30 PROCEDURE — 99232 SBSQ HOSP IP/OBS MODERATE 35: CPT | Performed by: INTERNAL MEDICINE

## 2023-01-30 PROCEDURE — 85025 COMPLETE CBC W/AUTO DIFF WBC: CPT

## 2023-01-30 RX ADMIN — PERFLUTREN 2 ML: 6.52 INJECTION, SUSPENSION INTRAVENOUS at 10:47

## 2023-01-30 RX ADMIN — IBUPROFEN 800 MG: 800 TABLET ORAL at 10:24

## 2023-01-30 RX ADMIN — SODIUM CHLORIDE, PRESERVATIVE FREE 10 ML: 5 INJECTION INTRAVENOUS at 10:53

## 2023-01-30 RX ADMIN — IBUPROFEN 800 MG: 800 TABLET ORAL at 18:55

## 2023-01-30 RX ADMIN — ENOXAPARIN SODIUM 30 MG: 100 INJECTION SUBCUTANEOUS at 09:03

## 2023-01-30 RX ADMIN — SODIUM CHLORIDE, PRESERVATIVE FREE 10 ML: 5 INJECTION INTRAVENOUS at 19:58

## 2023-01-30 RX ADMIN — SODIUM CHLORIDE, PRESERVATIVE FREE 10 ML: 5 INJECTION INTRAVENOUS at 09:03

## 2023-01-30 RX ADMIN — IBUPROFEN 800 MG: 800 TABLET ORAL at 15:13

## 2023-01-30 RX ADMIN — SPIRONOLACTONE 25 MG: 25 TABLET ORAL at 09:03

## 2023-01-30 RX ADMIN — ENOXAPARIN SODIUM 30 MG: 100 INJECTION SUBCUTANEOUS at 19:58

## 2023-01-30 RX ADMIN — AMLODIPINE BESYLATE 5 MG: 5 TABLET ORAL at 09:03

## 2023-01-30 RX ADMIN — LOSARTAN POTASSIUM 50 MG: 50 TABLET, FILM COATED ORAL at 09:03

## 2023-01-30 ASSESSMENT — ENCOUNTER SYMPTOMS
COUGH: 0
ABDOMINAL PAIN: 0
WHEEZING: 0
SHORTNESS OF BREATH: 0
CHEST TIGHTNESS: 0

## 2023-01-30 ASSESSMENT — PAIN SCALES - GENERAL
PAINLEVEL_OUTOF10: 4
PAINLEVEL_OUTOF10: 4
PAINLEVEL_OUTOF10: 5

## 2023-01-30 NOTE — PROGRESS NOTES
[address]    PROGRESS NOTE             1/30/2023    7:53 AM    Name:   Curtis Hwang  MRN:     [id number]     Acct:      [de-identified]   Room:   Count includes the [facility] Day:  1  Admit Date:  1/29/2023  6:48 AM    PCP:  No primary care provider on file. Code Status:  Full Code    Subjective:     C/C:   Chief Complaint   Patient presents with    Chest Pain     Interval History Status: improved. Patient seen and examined at bedside this morning  Pressure still elevated  Has not received morning dose yet however did receive yesterday's doses and blood pressure still elevated  states that normally he does eat fried foods every day  Will lower salt in his diet    Plan for echo today    Brief History:     The patient is a 28 y.o. Non- / non  male who presents withChest Pain   and he is admitted to the hospital for the management of  cardiomegaly on imaging. Patient is a 69-year-old -American male who complains of right-sided chest pain that started 2 weeks ago. The chest pain does not radiate. The pain comes and goes and it would go away on its own and come back on its own. He is not currently experiencing excruciating pain at the moment. States that the pain is worse with lying down is better with leaning forward. Patient also expresses that 11 years ago, he experienced similar chest pain when he was used to be playing vigorous basketball. He does not play as much as he does now. His family history is significant for an uncle who had heart attack \"a long time ago\". Patient does not have any past medical history or comorbidities. He denies any alcohol, smoking or illicit drug use. In the ER patient had a CTA which showed cardiomegaly. His chest x-ray showed cardiomegaly and a prominent right atrial contour of concern for possible valvular disease.   We will order an echo to rule out possible pericarditis versus valvular disease/cardiomyopathy. Will start patient on ibuprofen for pain control. Review of Systems:     Review of Systems   Constitutional:  Negative for chills and fever. HENT:  Negative for congestion. Eyes:  Negative for visual disturbance. Respiratory:  Negative for cough, chest tightness, shortness of breath and wheezing. Cardiovascular:  Positive for chest pain. Negative for palpitations and leg swelling. Gastrointestinal:  Negative for abdominal pain. Genitourinary:  Negative for dysuria. Neurological:  Negative for dizziness, syncope, weakness, light-headedness, numbness and headaches. Psychiatric/Behavioral:  Negative for confusion. Medications: Allergies:  No Known Allergies    Current Meds:   Scheduled Meds:    ibuprofen  800 mg Oral TID WC    sodium chloride flush  5-40 mL IntraVENous 2 times per day    enoxaparin  30 mg SubCUTAneous BID    spironolactone  25 mg Oral Daily    losartan  50 mg Oral Daily    amLODIPine  5 mg Oral Daily     Continuous Infusions:    sodium chloride       PRN Meds: perflutren lipid microspheres, sodium chloride flush, sodium chloride flush, sodium chloride, ondansetron **OR** ondansetron, polyethylene glycol, acetaminophen **OR** acetaminophen, potassium chloride **OR** potassium alternative oral replacement **OR** potassium chloride    Data:     Past Medical History:   has no past medical history on file. Social History:   reports that he has never smoked. He has never used smokeless tobacco. He reports that he does not drink alcohol and does not use drugs. Family History: History reviewed. No pertinent family history.     Vitals:  BP (!) 165/95   Pulse 72   Temp 98 °F (36.7 °C) (Oral)   Resp 18   Ht 6' 6\" (1.981 m)   Wt (!) 312 lb 9.8 oz (141.8 kg)   SpO2 98%   BMI 36.13 kg/m²   Temp (24hrs), Av.1 °F (36.7 °C), Min:97.9 °F (36.6 °C), Max:98.4 °F (36.9 °C)    No results for input(s): POCGLU in the last 72 hours.    I/O(24Hr): No intake or output data in the 24 hours ending 01/30/23 0753    Labs:    CBC:   Lab Results   Component Value Date/Time    WBC 6.2 01/30/2023 04:54 AM    RBC 5.92 01/30/2023 04:54 AM    HGB 14.5 01/30/2023 04:54 AM    HCT 44.2 01/30/2023 04:54 AM    MCV 74.7 01/30/2023 04:54 AM    MCH 24.4 01/30/2023 04:54 AM    MCHC 32.7 01/30/2023 04:54 AM    RDW 14.9 01/30/2023 04:54 AM     01/30/2023 04:54 AM    MPV 9.0 01/30/2023 04:54 AM     BMP:    Lab Results   Component Value Date/Time     01/30/2023 04:54 AM    K 3.9 01/30/2023 04:54 AM     01/30/2023 04:54 AM    CO2 25 01/30/2023 04:54 AM    BUN 11 01/30/2023 04:54 AM    LABALBU 4.2 01/29/2023 07:09 AM    CREATININE 0.96 01/30/2023 04:54 AM    CALCIUM 9.4 01/30/2023 04:54 AM    GFRAA NOT REPORTED 08/31/2012 02:05 AM    LABGLOM >60 01/30/2023 04:54 AM    GLUCOSE 86 01/30/2023 04:54 AM       Lab Results   Component Value Date/Time    SPECIAL NOT REPORTED 03/17/2018 06:04 AM     Lab Results   Component Value Date/Time    CULTURE NORMAL SKIN CRISTHIAN (A) 03/17/2018 06:04 AM    CULTURE  03/17/2018 06:04 AM     Performed at [address] [place] [address] [phone]         Radiology:    XR CHEST PORTABLE    Result Date: 1/29/2023  EXAMINATION: ONE XRAY VIEW OF THE CHEST 1/29/2023 7:36 am COMPARISON: None. HISTORY: ORDERING SYSTEM PROVIDED HISTORY: cp TECHNOLOGIST PROVIDED HISTORY: cp Reason for Exam: Chest pain on/off x 2 weeks FINDINGS: AP portable view of the chest time stamped at 754 hours demonstrates overlying cardiac monitoring electrodes. The patient has cardiomegaly, atypical for age. No vascular congestion, focal consolidation, effusion or pneumothorax is noted. Right atrial contour is prominent. No focal consolidation. The patient has cardiomegaly and a prominent right atrial contour of concern for possible valvular disease. RECOMMENDATION: Echocardiography advised.      CTA CHEST ABDOMEN PELVIS W CONTRAST    Result Date: 1/29/2023  EXAMINATION: CTA OF THE CHEST, ABDOMEN AND PELVIS WITH CONTRAST 1/29/2023 8:54 am: TECHNIQUE: CTA of the chest, abdomen and pelvis was performed after the administration of intravenous contrast.  Multiplanar reformatted images are provided for review. MIP images are provided for review. Automated exposure control, iterative reconstruction, and/or weight based adjustment of the mA/kV was utilized to reduce the radiation dose to as low as reasonably achievable. COMPARISON: None. HISTORY: ORDERING SYSTEM PROVIDED HISTORY: chest pain, cardiomegaly, aortic dissection TECHNOLOGIST PROVIDED HISTORY: chest pain, cardiomegaly, aortic dissection Decision Support Exception - unselect if not a suspected or confirmed emergency medical condition->Emergency Medical Condition (MA) Reason for Exam: chest pain, cardiomegaly, aortic dissection Additional signs and symptoms: per patient intermittent chest pain for the past 2 weeks Patient has cardiomegaly with right atrial predominance. Normal D-dimer. FINDINGS: CTA CHEST: Thoracic aorta: No evidence of thoracic aortic aneurysm or dissection. No acute abnormality of the aorta. Mediastinum: Pulmonary artery opacification is adequate. No filling defects diagnostic of pulmonary emboli. Main pulmonary artery is within upper limits of normal in diameter. No mediastinal or hilar adenopathy. The patient has moderate cardiomegaly. No pericardial effusion or epicardial adenopathy is noted. Right atrial predominance is noted. Lungs/Pleura: The lungs are without acute process. No focal consolidation or pulmonary edema. No evidence of pleural effusion or pneumothorax. Soft Tissues/Bones: No acute bone or soft tissue abnormality. CTA ABDOMEN: Abdominal aorta/Branches: Aorta is normal in caliber. Origin of the celiac axis, SMA, renal arteries and inferior mesenteric artery are visualized, normal in caliber without aneurysm, dissection or thrombosis. Organs: Hepatic steatosis is present. No focal hepatic lesions. Spleen, gallbladder, pancreas, adrenals and kidneys are grossly unremarkable. GI/Bowel: No free fluid, free air, bowel obstruction or bowel wall thickening. Appendix is unremarkable. Fat-containing umbilical hernia without strangulation is noted. Peritoneum/Retroperitoneum: Bladder is unremarkable. No free pelvic fluid, pelvic or inguinal adenopathy is noted. Bones/Soft Tissues: No acute osseous or subcutaneous soft tissue abnormality. CTA PELVIS: Aorta/Iliacs: Normal caliber. No aneurysm, dissection, or occlusion. Other: None Bones/Soft Tissues: No acute osseous or soft tissue abnormality. 1.  No aortic aneurysm, dissection, or occlusion. No major branch abnormalities. 2.  Hepatic steatosis. 3.  Cardiomegaly. No pericardial effusion. RECOMMENDATIONS: Advise echocardiography. Assess for myocardial inflammatory process. Current troponin levels are normal.         Physical Examination:        Physical Exam  Constitutional:       General: He is not in acute distress. Appearance: He is not ill-appearing. Cardiovascular:      Rate and Rhythm: Normal rate and regular rhythm. Pulses: Normal pulses. Heart sounds: Normal heart sounds. Pulmonary:      Effort: Pulmonary effort is normal.      Breath sounds: Normal breath sounds. Abdominal:      Palpations: Abdomen is soft. Musculoskeletal:      Right lower leg: No edema. Left lower leg: No edema. Skin:     General: Skin is warm. Neurological:      Mental Status: He is alert and oriented to person, place, and time.      Assessment:        Primary Problem  Dilated cardiomyopathy [facility])    Active Hospital Problems    Diagnosis Date Noted    Cardiomegaly [I51.7] 01/29/2023     Priority: Medium    Dilated cardiomyopathy [facility]) idiopathic [I42.0] 01/29/2023     Priority: Medium       Plan:        Chest pain with findings of cardiomegaly on imaging, r/o pericarditis vs valvular disease/cardiomyopathies   -Plan for echo today  -TSH, sed rate, CRP normal  - in process  -Continue ibuprofen 800 mg 3 times daily  -Cardiology following    HTN  -Cardiology added Norvasc 5 mg daily, Aldactone 25 mg daily and losartan 50 mg daily  -We will change diet to low-sodium  -Most likely may be familial as mother has hypertension and patient is obese  -If uncontrolled with medications, may need further work-up with PCP regarding secondary hypertension    DVT prophylaxis: lovenox 40mg sub/c  GI prophylaxis: not indicated at this time  Diet: regular diet with low sodium   Disposition: ECHO today     OT/PT/SW     Code Status: full    Christiano Nickerson MD  1/30/2023  7:53 AM        Attestation and add on       I have discussed the care of Norm Clamp , including pertinent history and exam findings,      1/30/23    with the resident. I have seen and examined the patient and the key elements of all parts of the encounter have been performed by me . I agree with the assessment, plan and orders as documented by the resident. Asymmetric LVH . Advised compliance with BP meds     MD TAMARA Covington [address], [address] [place] [address].    Phone [phone]   Fax: [phone]  Answering Service: [phone]

## 2023-01-30 NOTE — PROGRESS NOTES
[facility]    OCCUPATIONAL THERAPY MISSED TREATMENT NOTE   INPATIENT   Date: 23  Patient Name: Aneesh Garcia       Room:   MRN: [id number]   Account #: [de-identified]    : 1995  (28 y.o.)  Gender: male     REASON FOR MISSED TREATMENT:  Pt reports that he is IND with self-care and mobility. RN confirms. No need for skilled OT services at this time.    -   OT being discontinued at this time.  Patient functioning at Premorbid Level  No further needs  [facility], OTR/L

## 2023-01-30 NOTE — CARE COORDINATION
CASE MANAGEMENT NOTE:    Admission Date:  1/29/2023 Nir Alcala is a 28 y.o.  male    Admitted for : Cardiomegaly [I51.7]  Chest pain, unspecified type [R07.9]    Met with:  Patient    PCP:  Pt does not have one                                 Insurance:  [id number] [facility] [address]      Is patient alert and oriented at time of discussion:  Yes    Current Residence/ Living Arrangements:  independently at home             Current Services PTA:  No    Does patient go to outpatient dialysis: No  If yes, location and chair time:   Who is their nephrologist?     Is patient agreeable to VNS: No    Freedom of choice provided:  No    List of [address] provided: No    VNS chosen:  No    DME:  none    Home Oxygen: No    Nebulizer: No    CPAP/BIPAP: No    Supplier: N/A    Potential Assistance Needed: No    SNF needed: No    Freedom of choice and list provided: No    [facility]       Is patient currently receiving oral anticoagulation therapy? No    Is the Patient an [facility] with Readmission Risk Score greater than 14%? No  If yes, pt needs a follow up appointment made within 7 days. Family Members/Caregivers that are willing and able to care for patient at home:    yes    If yes, list name here:  Shane Doing    Family/caregivers educated on resources available including DME and respite care: NA    Transportation Provider:  Family             Discharge Plan:  1/30/23 Abdiel Robin Pt is from home in a one story home with his cousin DME denies VNS denies Plan is to discharge to home with no needs will continue to follow for needs . //tv                  Electronically signed by:  Emma Ramsey RN on 1/30/2023 at 12:40 PM

## 2023-01-30 NOTE — PROGRESS NOTES
01/30/23 1623   Encounter Summary   Encounter Overview/Reason  Spiritual/Emotional Needs   Service Provided For: Patient   Referral/Consult From: Rounding   Last Encounter  01/30/23   Complexity of Encounter High   Spiritual/Emotional needs   Type Spiritual Support   Assessment/Intervention/Outcome   Assessment Calm;Coping;Peaceful   Intervention Active listening;Sustaining Presence/Ministry of presence;Prayer (assurance of)/[place]   Outcome Acceptance; Coping;Engaged in conversation;Receptive

## 2023-01-30 NOTE — PLAN OF CARE
Problem: Discharge Planning  Goal: Discharge to home or other facility with appropriate resources  1/30/2023 1752 by Ruby Gerard RN  Outcome: Progressing

## 2023-01-31 VITALS
DIASTOLIC BLOOD PRESSURE: 83 MMHG | HEART RATE: 71 BPM | SYSTOLIC BLOOD PRESSURE: 149 MMHG | OXYGEN SATURATION: 99 % | RESPIRATION RATE: 18 BRPM | BODY MASS INDEX: 35.33 KG/M2 | HEIGHT: 78 IN | TEMPERATURE: 98.1 F | WEIGHT: 305.34 LBS

## 2023-01-31 LAB
ABSOLUTE EOS #: 0.11 K/UL (ref 0–0.4)
ABSOLUTE LYMPH #: 1.82 K/UL (ref 1–4.8)
ABSOLUTE MONO #: 0.55 K/UL (ref 0.1–1.3)
ANION GAP SERPL CALCULATED.3IONS-SCNC: 9 MMOL/L (ref 9–17)
BASOPHILS # BLD: 1 % (ref 0–2)
BASOPHILS ABSOLUTE: 0.06 K/UL (ref 0–0.2)
BUN BLDV-MCNC: 11 MG/DL (ref 6–20)
CALCIUM SERPL-MCNC: 9.2 MG/DL (ref 8.6–10.4)
CHLORIDE BLD-SCNC: 101 MMOL/L (ref 98–107)
CO2: 28 MMOL/L (ref 20–31)
CREAT SERPL-MCNC: 1.01 MG/DL (ref 0.7–1.2)
EKG ATRIAL RATE: 70 BPM
EKG P AXIS: 31 DEGREES
EKG P-R INTERVAL: 170 MS
EKG Q-T INTERVAL: 388 MS
EKG QRS DURATION: 102 MS
EKG QTC CALCULATION (BAZETT): 419 MS
EKG R AXIS: 105 DEGREES
EKG T AXIS: -4 DEGREES
EKG VENTRICULAR RATE: 70 BPM
EOSINOPHILS RELATIVE PERCENT: 2 % (ref 0–4)
FERRITIN: 83 NG/ML (ref 30–400)
GFR SERPL CREATININE-BSD FRML MDRD: >60 ML/MIN/1.73M2
GLUCOSE BLD-MCNC: 102 MG/DL (ref 70–99)
HCT VFR BLD CALC: 43.3 % (ref 41–53)
HEMOGLOBIN: 14.1 G/DL (ref 13.5–17.5)
IRON SATURATION: 24 % (ref 20–55)
IRON: 58 UG/DL (ref 59–158)
LYMPHOCYTES # BLD: 33 % (ref 24–44)
MAGNESIUM: 1.9 MG/DL (ref 1.6–2.6)
MCH RBC QN AUTO: 24.1 PG (ref 26–34)
MCHC RBC AUTO-ENTMCNC: 32.7 G/DL (ref 31–37)
MCV RBC AUTO: 73.7 FL (ref 80–100)
MONOCYTES # BLD: 10 % (ref 1–7)
MORPHOLOGY: ABNORMAL
PDW BLD-RTO: 15.3 % (ref 11.5–14.9)
PLATELET # BLD: 269 K/UL (ref 150–450)
PMV BLD AUTO: 8.7 FL (ref 6–12)
POTASSIUM SERPL-SCNC: 3.5 MMOL/L (ref 3.7–5.3)
RBC # BLD: 5.87 M/UL (ref 4.5–5.9)
SEG NEUTROPHILS: 54 % (ref 36–66)
SEGMENTED NEUTROPHILS ABSOLUTE COUNT: 2.96 K/UL (ref 1.3–9.1)
SODIUM BLD-SCNC: 138 MMOL/L (ref 135–144)
TOTAL IRON BINDING CAPACITY: 242 UG/DL (ref 250–450)
UNSATURATED IRON BINDING CAPACITY: 184 UG/DL (ref 112–347)
WBC # BLD: 5.5 K/UL (ref 3.5–11)

## 2023-01-31 PROCEDURE — 85025 COMPLETE CBC W/AUTO DIFF WBC: CPT

## 2023-01-31 PROCEDURE — 99233 SBSQ HOSP IP/OBS HIGH 50: CPT | Performed by: INTERNAL MEDICINE

## 2023-01-31 PROCEDURE — 83735 ASSAY OF MAGNESIUM: CPT

## 2023-01-31 PROCEDURE — 2580000003 HC RX 258

## 2023-01-31 PROCEDURE — 83540 ASSAY OF IRON: CPT

## 2023-01-31 PROCEDURE — 83550 IRON BINDING TEST: CPT

## 2023-01-31 PROCEDURE — 82664 ELECTROPHORETIC TEST: CPT

## 2023-01-31 PROCEDURE — 82728 ASSAY OF FERRITIN: CPT

## 2023-01-31 PROCEDURE — 83020 HEMOGLOBIN ELECTROPHORESIS: CPT

## 2023-01-31 PROCEDURE — 36415 COLL VENOUS BLD VENIPUNCTURE: CPT

## 2023-01-31 PROCEDURE — 6370000000 HC RX 637 (ALT 250 FOR IP): Performed by: INTERNAL MEDICINE

## 2023-01-31 PROCEDURE — 80048 BASIC METABOLIC PNL TOTAL CA: CPT

## 2023-01-31 PROCEDURE — 93010 ELECTROCARDIOGRAM REPORT: CPT | Performed by: INTERNAL MEDICINE

## 2023-01-31 PROCEDURE — 6370000000 HC RX 637 (ALT 250 FOR IP)

## 2023-01-31 RX ORDER — LOSARTAN POTASSIUM 50 MG/1
50 TABLET ORAL DAILY
Qty: 30 TABLET | Refills: 3 | Status: SHIPPED | OUTPATIENT
Start: 2023-01-31

## 2023-01-31 RX ORDER — SPIRONOLACTONE 25 MG/1
25 TABLET ORAL DAILY
Qty: 30 TABLET | Refills: 3 | Status: SHIPPED | OUTPATIENT
Start: 2023-01-31

## 2023-01-31 RX ORDER — AMLODIPINE BESYLATE 5 MG/1
5 TABLET ORAL DAILY
Qty: 30 TABLET | Refills: 3 | Status: SHIPPED | OUTPATIENT
Start: 2023-01-31

## 2023-01-31 RX ADMIN — AMLODIPINE BESYLATE 5 MG: 5 TABLET ORAL at 07:44

## 2023-01-31 RX ADMIN — SODIUM CHLORIDE, PRESERVATIVE FREE 10 ML: 5 INJECTION INTRAVENOUS at 07:44

## 2023-01-31 RX ADMIN — IBUPROFEN 800 MG: 800 TABLET ORAL at 13:01

## 2023-01-31 RX ADMIN — POTASSIUM CHLORIDE 40 MEQ: 1500 TABLET, EXTENDED RELEASE ORAL at 08:06

## 2023-01-31 RX ADMIN — IBUPROFEN 800 MG: 800 TABLET ORAL at 07:44

## 2023-01-31 RX ADMIN — SPIRONOLACTONE 25 MG: 25 TABLET ORAL at 07:44

## 2023-01-31 RX ADMIN — LOSARTAN POTASSIUM 50 MG: 50 TABLET, FILM COATED ORAL at 07:44

## 2023-01-31 NOTE — PLAN OF CARE
Problem: Discharge Planning  Goal: Discharge to home or other facility with appropriate resources  1/30/2023 2333 by Be Solomon RN  Outcome: Progressing

## 2023-01-31 NOTE — CARE COORDINATION
ONGOING DISCHARGE PLAN:    Patient is alert and oriented x4. Spoke with patient regarding discharge plan and patient confirms that plan is still to go home with no needs. Will continue to follow for additional discharge needs.     Electronically signed by Varsha Faith RN on 1/31/2023 at 10:04 AM

## 2023-01-31 NOTE — PROGRESS NOTES
CLINICAL PHARMACY NOTE: MEDS TO BEDS    Total # of Prescriptions Filled: 3   The following medications were delivered to the patient:  Losartan Pot 50mg  Amlodipine Besylate 5mg  Spironolactone 25mg    Additional Documentation: delivered meds to patients room 10:00am 01/31/23 kim

## 2023-01-31 NOTE — PROGRESS NOTES
[facility] [email or web address]    PROGRESS NOTE             1/31/2023    7:48 AM    Name:   Fredy Potts  MRN:     [id number]     Acct:      [de-identified]   Room:   [facility] Day:  2  Admit Date:  1/29/2023  6:48 AM    PCP:  No primary care provider on file. Code Status:  Full Code    Subjective:     C/C:   Chief Complaint   Patient presents with    Chest Pain     Interval History Status: improved. Patient seen and examined at the bed side, no new acute events overnight. BP has been elevated but around 529-940 systolic. Plan for DC today    Notes from nursing staff and Consults had been reviewed, and the overnight progress had been checked with the nursing staff as well. Brief History:     Please see H&P. Review of Systems:     CONSTITUTIONAL:  no fevers  Psych: Normal mood and affect, no depression, no suicidal ideation. EYES: negative for blury vision  HEENT: No headaches, no nasal congestion, no difficulty swallowing  RESPIRATORY:negative for dyspnea, no wheezing, no Cough  CARDIOVASCULAR: negative for chest pain, no palpitations  GASTROINTESTINAL: no nausea, no vomiting, no change in bowel habits, no abdominal pain   GENITOURINARY: negative for dysuria, no hematuria   MUSCULOSKELETAL: no joint pains, no muscle aches, no swelling of joints or extremities  NEUROLOGICAL: No weakness or numbness      Medications:      Allergies:    No Known Allergies    Current Meds:   Scheduled Meds:    ibuprofen  800 mg Oral TID WC    sodium chloride flush  5-40 mL IntraVENous 2 times per day    enoxaparin  30 mg SubCUTAneous BID    spironolactone  25 mg Oral Daily    losartan  50 mg Oral Daily    amLODIPine  5 mg Oral Daily     Continuous Infusions:    sodium chloride       PRN Meds: sodium chloride flush, sodium chloride flush, sodium chloride, ondansetron **OR** ondansetron, polyethylene glycol, acetaminophen **OR** acetaminophen, potassium chloride **OR** potassium alternative oral replacement **OR** potassium chloride    Data:     Past Medical History:   has no past medical history on file. Social History:   reports that he has never smoked. He has never used smokeless tobacco. He reports that he does not drink alcohol and does not use drugs. Family History:   History reviewed. No pertinent family history. Vitals:  BP (!) 149/83   Pulse 71   Temp 98.1 °F (36.7 °C)   Resp 18   Ht 6' 6\" (1.981 m)   Wt (!) 305 lb 5.4 oz (138.5 kg)   SpO2 99%   BMI 35.29 kg/m²   Temp (24hrs), Av.9 °F (36.6 °C), Min:97.7 °F (36.5 °C), Max:98.1 °F (36.7 °C)      No results for input(s): POCGLU in the last 72 hours. I/O(24Hr): Intake/Output Summary (Last 24 hours) at 2023 0748  Last data filed at 2023 1752  Gross per 24 hour   Intake 950 ml   Output --   Net 950 ml       Labs:    CBC:   Lab Results   Component Value Date/Time    WBC 5.5 2023 05:20 AM    RBC 5.87 2023 05:20 AM    HGB 14.1 2023 05:20 AM    HCT 43.3 2023 05:20 AM    MCV 73.7 2023 05:20 AM    MCH 24.1 2023 05:20 AM    MCHC 32.7 2023 05:20 AM    RDW 15.3 2023 05:20 AM     2023 05:20 AM    MPV 8.7 2023 05:20 AM       Lab Results   Component Value Date/Time    SPECIAL NOT REPORTED 2018 06:04 AM     Lab Results   Component Value Date/Time    CULTURE NORMAL SKIN CRISTHIAN (A) 2018 06:04 AM    CULTURE  2018 06:04 AM     Performed at [address] [place] [address], [address] [place] [address] [phone]         Radiology:    XR CHEST PORTABLE    Result Date: 2023  EXAMINATION: ONE XRAY VIEW OF THE CHEST 2023 7:36 am COMPARISON: None. HISTORY: ORDERING SYSTEM PROVIDED HISTORY: cp TECHNOLOGIST PROVIDED HISTORY: cp Reason for Exam: Chest pain on/off x 2 weeks FINDINGS: AP portable view of the chest time stamped at 754 hours demonstrates overlying cardiac monitoring electrodes.   The patient has cardiomegaly, atypical for age. No vascular congestion, focal consolidation, effusion or pneumothorax is noted. Right atrial contour is prominent. No focal consolidation. The patient has cardiomegaly and a prominent right atrial contour of concern for possible valvular disease. RECOMMENDATION: Echocardiography advised. CTA CHEST ABDOMEN PELVIS W CONTRAST    Result Date: 1/29/2023  EXAMINATION: CTA OF THE CHEST, ABDOMEN AND PELVIS WITH CONTRAST 1/29/2023 8:54 am: TECHNIQUE: CTA of the chest, abdomen and pelvis was performed after the administration of intravenous contrast.  Multiplanar reformatted images are provided for review. MIP images are provided for review. Automated exposure control, iterative reconstruction, and/or weight based adjustment of the mA/kV was utilized to reduce the radiation dose to as low as reasonably achievable. COMPARISON: None. HISTORY: ORDERING SYSTEM PROVIDED HISTORY: chest pain, cardiomegaly, aortic dissection TECHNOLOGIST PROVIDED HISTORY: chest pain, cardiomegaly, aortic dissection Decision Support Exception - unselect if not a suspected or confirmed emergency medical condition->Emergency Medical Condition (MA) Reason for Exam: chest pain, cardiomegaly, aortic dissection Additional signs and symptoms: per patient intermittent chest pain for the past 2 weeks Patient has cardiomegaly with right atrial predominance. Normal D-dimer. FINDINGS: CTA CHEST: Thoracic aorta: No evidence of thoracic aortic aneurysm or dissection. No acute abnormality of the aorta. Mediastinum: Pulmonary artery opacification is adequate. No filling defects diagnostic of pulmonary emboli. Main pulmonary artery is within upper limits of normal in diameter. No mediastinal or hilar adenopathy. The patient has moderate cardiomegaly. No pericardial effusion or epicardial adenopathy is noted. Right atrial predominance is noted. Lungs/Pleura: The lungs are without acute process. No focal consolidation or pulmonary edema. No evidence of pleural effusion or pneumothorax. Soft Tissues/Bones: No acute bone or soft tissue abnormality. CTA ABDOMEN: Abdominal aorta/Branches: Aorta is normal in caliber. Origin of the celiac axis, SMA, renal arteries and inferior mesenteric artery are visualized, normal in caliber without aneurysm, dissection or thrombosis. Organs: Hepatic steatosis is present. No focal hepatic lesions. Spleen, gallbladder, pancreas, adrenals and kidneys are grossly unremarkable. GI/Bowel: No free fluid, free air, bowel obstruction or bowel wall thickening. Appendix is unremarkable. Fat-containing umbilical hernia without strangulation is noted. Peritoneum/Retroperitoneum: Bladder is unremarkable. No free pelvic fluid, pelvic or inguinal adenopathy is noted. Bones/Soft Tissues: No acute osseous or subcutaneous soft tissue abnormality. CTA PELVIS: Aorta/Iliacs: Normal caliber. No aneurysm, dissection, or occlusion. Other: None Bones/Soft Tissues: No acute osseous or soft tissue abnormality. 1.  No aortic aneurysm, dissection, or occlusion. No major branch abnormalities. 2.  Hepatic steatosis. 3.  Cardiomegaly. No pericardial effusion. RECOMMENDATIONS: Advise echocardiography. Assess for myocardial inflammatory process. Current troponin levels are normal.           Physical Examination:        PHYSICAL EXAM:  General Appearance  Alert , awake, not in acute distress  Psych: Normal mood and affect, normal behavior, not agitated, maintaining good eye contact   HEENT - Head is normocephalic, atraumatic. Neck: supple, no rigidity, normal ROM, no neck swellings, normal thyroid gland  Lungs - Bilateral equal air entry, no wheezes, rales or rhonchi, aeration good  Cardiovascular - Heart sounds are normal.  Regular rhythm, normal rate without murmur, gallop or rub.   Abdomen - Soft, nontender, nondistended, no masses or organomegaly  Neurologic - There are no new focal motor or sensory deficits  Skin - No bruising or bleeding on exposed skin area  Extremities - No cyanosis, clubbing or edema      Assessment:        Primary Problem  Dilated cardiomyopathy [facility])    Active Hospital Problems    Diagnosis Date Noted    Cardiomegaly [I51.7] 01/29/2023     Priority: Medium    Dilated cardiomyopathy ([address] idiopathic [I42.0] 01/29/2023     Priority: Medium       Plan:        disease/cardiomyopathies   -Plan for echo today  -TSH, sed rate, CRP normal  - in process  -Continue ibuprofen 800 mg 3 times daily  -Cardiology following     HTN  -Cardiology added Norvasc 5 mg daily, Aldactone 25 mg daily and losartan 50 mg daily  -We will change diet to low-sodium  -Most likely may be familial as mother has hypertension and patient is obese  -If uncontrolled with medications, may need further work-up with PCP regarding secondary hypertension       DVT prophylaxis: Lovenox 40 mg SC  GI prophylaxis:  Zeenat Zendejas MD  1/31/2023  7:48 AM      Attestation and add on       I have discussed the care of Lance Reid , including pertinent history and exam findings,      1/31/23    with the resident. I have seen and examined the patient and the key elements of all parts of the encounter have been performed by me . I agree with the assessment, plan and orders as documented by the resident. Hypertensive heart disease      MD TAMARA [address], [address] [place] [address].    Phone [phone]   Fax: [phone]  Answering Service: [phone]

## 2023-01-31 NOTE — PROGRESS NOTES
Date:   1/31/2023  Patient name: Mario Brown  Date of admission:  1/29/2023  6:48 AM  MRN:   [id number]  YOB: 1995  PCP: No primary care provider on file. Reason for Admission: Cardiomegaly [I51.7]  Chest pain, unspecified type [R07.9]    Cardiology follow-up: Atypical chest pain, uncontrolled hypertension       Referring physician: Dr Porter To     Impression  Hypertension, uncontrolled, patient was not on any medication  Cardiomegaly no pericardial effusion / CTA chest 1/29/2023  Normal thoracic and abdominal aorta and branches  Normal LV size, normal wall motion, moderate LVH, no significant valvular abnormality, no pericardial effusion, mildly dilated RV with normal function, 2D echo 1/30/2023  Morbid obesity BMI 36  Normal renal function  Low MCV     ECG 1/29/2023  Sinus rhythm heart rate 70, right axis, minimal voltage criteria for LVH, nonspecific T wave changes lead III, aVF and V6     CTA chest, abdomen and pelvis with contrast 1/29/2023  Cardiomegaly  No evidence of thoracic aortic aneurysm or dissection  No pulmonary embolism, no pericardial effusion no pneumothorax or pleural effusion  Normal abdominal aorta and branches  Hepatic asteatosis, kidneys are grossly unremarkable  Peritoneum/retroperitoneum unremarkable    2D echo 1/30/2023  Normal LV size 5.7 cm, moderate LVH IVSD 4.2 cm, normal wall motion, ejection fraction more than 55%  Dilated RV within normal function, normal right ventricular systolic pressure  No pericardial effusion  IVC not well-visualized    History of present illness     54-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 No fever no chills no productive cough no wheezing. No history of syncope. At work he is doing very heavy duty work without any problem.      On admission blood pressure was 176/106, temperature 98, oxygen saturation 97%  Lab work on admission  Total CK5 74, high-sensitivity troponin 9  Sodium 138, potassium 3.8, BUN 11, creatinine 1.00, glucose 109, calcium 9.0  TSH 3.42, free thyroxine 0.80  Hemoglobin 13.8, WBC 5.7, platelets 281, MCV 74  Influenza a and B and COVID-19 not detected     Current evaluation  Patient seen and examined  He was resting on bed watching TV  He said his right-sided chest pain almost gone  No fever no chills no shortness of breath  ECG monitor sinus rhythm  Afebrile hemodynamically stable blood pressure is still slightly elevated    2D echo showed normal LV size no pericardial effusion moderately increased septal wall thickness      Sodium 138, potassium 3.5, creatinine 1.01, BUN 11, glucose 102, hemoglobin 14.1, MCV 74    Medications:   Scheduled Meds:   ibuprofen  800 mg Oral TID WC    sodium chloride flush  5-40 mL IntraVENous 2 times per day    enoxaparin  30 mg SubCUTAneous BID    spironolactone  25 mg Oral Daily    losartan  50 mg Oral Daily    amLODIPine  5 mg Oral Daily     Continuous Infusions:   sodium chloride       CBC:   Recent Labs     01/29/23  0709 01/30/23  0454 01/31/23  0520   WBC 5.7 6.2 5.5   HGB 13.8 14.5 14.1    290 269     BMP:    Recent Labs     01/29/23  0709 01/30/23  0454 01/31/23  0520    137 138   K 3.8 3.9 3.5*    102 101   CO2 27 25 28   BUN 11 11 11   CREATININE 1.00 0.96 1.01   GLUCOSE 109* 86 102*     Hepatic:   Recent Labs     01/29/23  0709   AST 25   ALT 28   BILITOT 0.3   ALKPHOS 96     Troponin: No results for input(s): TROPONINI in the last 72 hours.  BNP: No results for input(s): BNP in the last 72 hours.  Lipids: No results for input(s): CHOL, HDL in the last 72 hours.    Invalid input(s): LDLCALCU  INR: No results for input(s): INR in the last 72 hours.    Objective:   Vitals: BP (!) 149/83   Pulse 71   Temp 98.1 °F (36.7 °C)   Resp 18   Ht 6' 6\" (1.981 m)   Wt (!) 305 lb 5.4 oz (138.5 kg)   SpO2 99%   BMI 35.29  kg/m²   General appearance: alert and cooperative with exam  HEENT: Head: Normal, normocephalic, atraumatic. Neck: no JVD and supple, symmetrical, trachea midline  Lungs: clear to auscultation bilaterally  Heart: regular rate and rhythm  Abdomen:  Obese soft bowel sounds present  Extremities: Homans sign is negative, no sign of DVT  Neurologic: Mental status: Alert, oriented, thought content appropriate    EKG: Sinus rhythm, right axis, minimal voltage criteria for LVH. ECHO: reviewed.    Ejection fraction: 60%, moderate Carlos increased septal wall thickness 1.42 cm alveolar abnormality, mildly dilated LA, normal RV function but RV appears dilated, 2D echo 1/30/2023    Assessment / Acute Cardiac Problems:   Atypical chest pain  Negative troponin  Hypertension  Cardiomegaly / CTA chest,, normal LV size 5.7 cm as per 2D echo  Moderately increased LV septal wall thickness 1.42 cm, ejection fraction 60%, mildly dilated RV with normal function, 2D echo 1/30/2023  No obvious sign of cardiopulmonary decompensation    Patient Active Problem List:     Cardiomegaly     Dilated cardiomyopathy ([address] idiopathic      Plan of Treatment:   Medications reviewed  Continue current dose of losartan, amlodipine, Aldactone  Discussed with the patient to reduce weight  Okay to discharge home  Follow-up in 6 to 8 weeks  Discussed with the patient find PCP    Electronically signed by Sravan Arellano MD on 1/31/2023 at 1:27 PM

## 2023-01-31 NOTE — DISCHARGE INSTR - COC
Continuity of Care Form    Patient Name: Curtis Hwang   :  1995  MRN:  [id number]    Admit date:  2023  Discharge date:  ***    Code Status Order: Full Code   Advance Directives:     Admitting Physician:  Alexandro Anton MD  PCP: No primary care provider on file. Discharging Nurse: [facility] Unit/Room#: 2103/2103-01  Discharging Unit Phone Number: ***    Emergency Contact:   Extended Emergency Contact Information  Primary Emergency Contact: Hernando Vera  Address: [address] [facility], [address] [place], [place] [address]s of [address] [place] [address] Phone: [phone]  Mobile Phone: [phone]  Relation: Parent    Past Surgical History:  History reviewed. No pertinent surgical history. Immunization History: There is no immunization history on file for this patient.     Active Problems:  Patient Active Problem List   Diagnosis Code    Cardiomegaly I51.7    Dilated cardiomyopathy ([facility] idiopathic I42.0       Isolation/Infection:   Isolation            No Isolation          Patient Infection Status       Infection Onset Added Last Indicated Last Indicated By Review Planned Expiration Resolved Resolved By    None active    Resolved    COVID-19 (Rule Out) 23 COVID-19 & Influenza Combo (Ordered)   23 Rule-Out Test Resulted            Nurse Assessment:  Last Vital Signs: BP (!) 146/84   Pulse 59   Temp 97.9 °F (36.6 °C)   Resp 18   Ht 6' 6\" (1.981 m)   Wt (!) 305 lb 5.4 oz (138.5 kg)   SpO2 97%   BMI 35.29 kg/m²     Last documented pain score (0-10 scale): Pain Level: 4  Last Weight:   Wt Readings from Last 1 Encounters:   23 (!) 305 lb 5.4 oz (138.5 kg)     Mental Status:  {IP PT MENTAL STATUS:31219}    IV Access:  { SAM IV ACCESS:617860118}    Nursing Mobility/ADLs:  Walking   {CHP DME KSYJ:095626478}  Transfer  {CHP DME ZGTI:267332572}  Bathing  {CHP DME ELOJ:418549458}  Dressing  {CHP DME RIJD:980449812}  Toileting  {CHP DME QDTY:844966692}  Feeding  {[facility] DME KJTJ:133635804}  Med Admin  {[facility] DME HGEI:667212065}  Med Delivery   { SAM MED Delivery:312371421}    Wound Care Documentation and Therapy:        Elimination:  Continence: Bowel: {YES / RQ:32662}  Bladder: {YES / IW:83361}  Urinary Catheter: {Urinary Catheter:544388722}   Colostomy/Ileostomy/Ileal Conduit: {YES / EB:23235}       Date of Last BM: ***    Intake/Output Summary (Last 24 hours) at 2023 0702  Last data filed at 2023 1752  Gross per 24 hour   Intake 950 ml   Output --   Net 950 ml     I/O last 3 completed shifts:   In: 12 [P.O.:950]  Out: -     Safety Concerns:     [facility] Safety Concerns:095927326}    Impairments/Disabilities:      [facility] [employer] Impairments/Disabilities:286948127}    Nutrition Therapy:  Current Nutrition Therapy:   [facility] [employer] Diet List:710500489}    Routes of Feeding: {[facility] DME Other Feedings:398637735}  Liquids: {Slp liquid thickness:61001}  Daily Fluid Restriction: {[facility] DME Yes amt example:584795897}  Last Modified Barium Swallow with Video (Video Swallowing Test): {Done Not Done HJXE:301426770}    Treatments at the Time of Hospital Discharge:   Respiratory Treatments: ***  Oxygen Therapy:  {Therapy; copd oxygen:19113}  Ventilator:    { CC Vent IQJ}    Rehab Therapies: {THERAPEUTIC INTERVENTION:1403506658}  Weight Bearing Status/Restrictions: [employer] Weight Bearin}  Other Medical Equipment (for information only, NOT a DME order):  {EQUIPMENT:496001956}  Other Treatments: ***    Patient's personal belongings (please select all that are sent with patient):  {[facility] DME Belongings:826463528}    RN SIGNATURE:  {Esignature:334132674}    CASE MANAGEMENT/SOCIAL WORK SECTION    Inpatient Status Date: ***    Readmission Risk Assessment Score:  Readmission Risk              Risk of Unplanned Readmission:  6           Discharging to Facility/ Agency   Name:   Address:  Phone:  Fax:    Dialysis Facility (if applicable)   Name:  Address:  Dialysis Schedule:  Phone:  Fax:    / signature: {Esignature:478575486}    PHYSICIAN SECTION    Prognosis: {Prognosis:4157803010}    Condition at Discharge: Yaz Vera Patient Condition:422419102}    Rehab Potential (if transferring to Rehab): {Prognosis:5683715009}    Recommended Labs or Other Treatments After Discharge: ***    Physician Certification: I certify the above information and transfer of Donnell Xiong  is necessary for the continuing treatment of the diagnosis listed and that he requires {Admit to Appropriate Level of Care:21430} for {GREATER/LESS:362185301} 30 days.      Update Admission H&P: [facility]44[facility]}    PHYSICIAN SIGNATURE:  {Esignature:634634907}

## 2023-01-31 NOTE — PROGRESS NOTES
Patient educated on discharge instructions which include: medication schedule, reasons for new medications and some side effects and need to follow-up. Patient given new prescriptions during teaching. Patient verbalizes understanding of discharge and states readiness for discharge. All belongings were gathered by patient and in patient's possession. No distress noted at this time. Current vital signs:  BP (!) 149/83   Pulse 71   Temp 98.1 °F (36.7 °C)   Resp 18   Ht 6' 6\" (1.981 m)   Wt (!) 305 lb 5.4 oz (138.5 kg)   SpO2 99%   BMI 35.29 kg/m²                MEWS Score: 1    Pt walked down by [facility]. Medications brought to pt by pharmacy.

## 2023-01-31 NOTE — PLAN OF CARE
Problem: Discharge Planning  Goal: Discharge to home or other facility with appropriate resources  1/31/2023 0821 by Akira Peace RN  Outcome: Adequate for Discharge  1/31/2023 0820 by Akira Peace RN  Outcome: Adequate for Discharge  1/30/2023 2333 by Jennifer Mullen RN  Outcome: Progressing

## 2023-02-02 LAB
HGB ELECTROPHORESIS INTERP: NORMAL
PATHOLOGIST: NORMAL

## 2023-02-05 NOTE — DISCHARGE SUMMARY
[address]    Discharge Summary     Patient ID: Eleazar Grimaldo  :  1995   MRN: [id number]     ACCOUNT:  [de-identified]   Patient's PCP: No primary care provider on file. Admit Date: 2023   Discharge Date: 2023     Length of Stay: 2  Code Status:  Prior  Admitting Physician: Tiffanie Mcpherson MD  Discharge Physician: Devin Mobley MD     Active Discharge Diagnoses:       Primary Problem  Dilated cardiomyopathy [facility])      [facility] Problems    Diagnosis Date Noted    Cardiomegaly [I51.7] 2023     Priority: Medium    Dilated cardiomyopathy ([facility] [place] [address] idiopathic [I42.0] 2023     Priority: Medium       Admission Condition:  fair     Discharged Condition: good    Hospital Stay:       Hospital Course:  Eleazar Grimaldo is a 28 y.o. male who was admitted for the management of   Dilated cardiomyopathy ([facility] [place] [address] , presented to ER with Chest Pain. Mr. Diane Lynne presented to the ED complaining of chest pain that started 2 weeks ago. Pain was episodic, and worse when lying down leaning forward. Patient in the ED had a CTA which showed cardiomegaly, chest x-ray also showed cardiomegaly and prominent right atrial contour of concern of possible valvular disease. Cardiology were consulted and the plan to exclude possible pericarditis versus valvular disease/cardiomyopathy was planned. Cardiology were consulted      Patient was also persistently hypertensive during admission, Norvasc 5 mg and Aldactone 25 mg a day as well as losartan 50 mg a day were added by cardiology    Echocardiogram showed normal left ventricular size moderate left ventricular hypertrophy, normal wall fraction ejection fraction more than 55%. There was dilated right ventricular with normal function normal right ventricular systolic blood pressure.     Patient remained stable during admission, negative troponins and was plan to follow up with outpatient cardiology. Significant therapeutic interventions:     Significant Diagnostic Studies:   Labs / Micro:  BMP:    Lab Results   Component Value Date/Time    GLUCOSE 102 01/31/2023 05:20 AM     01/31/2023 05:20 AM    K 3.5 01/31/2023 05:20 AM     01/31/2023 05:20 AM    CO2 28 01/31/2023 05:20 AM    ANIONGAP 9 01/31/2023 05:20 AM    BUN 11 01/31/2023 05:20 AM    CREATININE 1.01 01/31/2023 05:20 AM    CALCIUM 9.2 01/31/2023 05:20 AM    LABGLOM >60 01/31/2023 05:20 AM    GFRAA NOT REPORTED 08/31/2012 02:05 AM    GFR      08/31/2012 02:05 AM    GFR      08/31/2012 02:05 AM             Radiology:    XR CHEST PORTABLE    Result Date: 1/29/2023  EXAMINATION: ONE XRAY VIEW OF THE CHEST 1/29/2023 7:36 am COMPARISON: None. HISTORY: ORDERING SYSTEM PROVIDED HISTORY: cp TECHNOLOGIST PROVIDED HISTORY: cp Reason for Exam: Chest pain on/off x 2 weeks FINDINGS: AP portable view of the chest time stamped at 754 hours demonstrates overlying cardiac monitoring electrodes. The patient has cardiomegaly, atypical for age. No vascular congestion, focal consolidation, effusion or pneumothorax is noted. Right atrial contour is prominent. No focal consolidation. The patient has cardiomegaly and a prominent right atrial contour of concern for possible valvular disease. RECOMMENDATION: Echocardiography advised. CTA CHEST ABDOMEN PELVIS W CONTRAST    Result Date: 1/29/2023  EXAMINATION: CTA OF THE CHEST, ABDOMEN AND PELVIS WITH CONTRAST 1/29/2023 8:54 am: TECHNIQUE: CTA of the chest, abdomen and pelvis was performed after the administration of intravenous contrast.  Multiplanar reformatted images are provided for review. MIP images are provided for review. Automated exposure control, iterative reconstruction, and/or weight based adjustment of the mA/kV was utilized to reduce the radiation dose to as low as reasonably achievable. COMPARISON: None.  HISTORY: ORDERING SYSTEM PROVIDED HISTORY: chest pain, cardiomegaly, aortic dissection TECHNOLOGIST PROVIDED HISTORY: chest pain, cardiomegaly, aortic dissection Decision Support Exception - unselect if not a suspected or confirmed emergency medical condition->Emergency Medical Condition (MA) Reason for Exam: chest pain, cardiomegaly, aortic dissection Additional signs and symptoms: per patient intermittent chest pain for the past 2 weeks Patient has cardiomegaly with right atrial predominance. Normal D-dimer. FINDINGS: CTA CHEST: Thoracic aorta: No evidence of thoracic aortic aneurysm or dissection. No acute abnormality of the aorta. Mediastinum: Pulmonary artery opacification is adequate. No filling defects diagnostic of pulmonary emboli. Main pulmonary artery is within upper limits of normal in diameter. No mediastinal or hilar adenopathy. The patient has moderate cardiomegaly. No pericardial effusion or epicardial adenopathy is noted. Right atrial predominance is noted. Lungs/Pleura: The lungs are without acute process. No focal consolidation or pulmonary edema. No evidence of pleural effusion or pneumothorax. Soft Tissues/Bones: No acute bone or soft tissue abnormality. CTA ABDOMEN: Abdominal aorta/Branches: Aorta is normal in caliber. Origin of the celiac axis, SMA, renal arteries and inferior mesenteric artery are visualized, normal in caliber without aneurysm, dissection or thrombosis. Organs: Hepatic steatosis is present. No focal hepatic lesions. Spleen, gallbladder, pancreas, adrenals and kidneys are grossly unremarkable. GI/Bowel: No free fluid, free air, bowel obstruction or bowel wall thickening. Appendix is unremarkable. Fat-containing umbilical hernia without strangulation is noted. Peritoneum/Retroperitoneum: Bladder is unremarkable. No free pelvic fluid, pelvic or inguinal adenopathy is noted. Bones/Soft Tissues: No acute osseous or subcutaneous soft tissue abnormality. CTA PELVIS: Aorta/Iliacs: Normal caliber.   No aneurysm, dissection, or occlusion. Other: None Bones/Soft Tissues: No acute osseous or soft tissue abnormality. 1.  No aortic aneurysm, dissection, or occlusion. No major branch abnormalities. 2.  Hepatic steatosis. 3.  Cardiomegaly. No pericardial effusion. RECOMMENDATIONS: Advise echocardiography. Assess for myocardial inflammatory process. Current troponin levels are normal.         Consultations:    Consults:     Final Specialist Recommendations/Findings:   IP CONSULT TO SOCIAL WORK  IP CONSULT TO CARDIOLOGY      The patient was seen and examined on day of discharge and this discharge summary is in conjunction with any daily progress note from day of discharge. Discharge plan:       Disposition: Home    Physician Follow Up:     [address] [id number] [phone]  Schedule an appointment as soon as possible for a visit  Establish care with PCP    Valeria Hernández MD  [address] [place] [address]  [address] [facility] [address]    Schedule an appointment as soon as possible for a visit         Requiring Further Evaluation/Follow Up POST HOSPITALIZATION/Incidental Findings: Patient was given 1 week of work with only light duties    Diet: regular diet    Activity: As tolerated    Instructions to Patient: Patient needs to find a PCP and follow-up. Patient follow-up with cardiology as outpatient    Discharge Medications:      Medication List        START taking these medications      amLODIPine 5 MG tablet  Commonly known as: NORVASC  Take 1 tablet by mouth daily     losartan 50 MG tablet  Commonly known as: COZAAR  Take 1 tablet by mouth daily     spironolactone 25 MG tablet  Commonly known as: ALDACTONE  Take 1 tablet by mouth daily            CHANGE how you take these medications      ibuprofen 800 MG tablet  Commonly known as: ADVIL;MOTRIN  What changed: Another medication with the same name was removed.  Continue taking this medication, and follow the directions you see here.            STOP taking these medications      acetaminophen 500 MG tablet  Commonly known as: TYLENOL     benzocaine 10 % mucosal gel  Commonly known as: ORAJEL               Where to Get Your Medications        These medications were sent to [facility] [address], [address] [address]      Phone: [phone]   amLODIPine 5 MG tablet  losartan 50 MG tablet  spironolactone 25 MG tablet           Electronically signed by   Lea Gonsalves MD  2/5/2023  1:36 PM      Thank you Dr. Denia Lester primary care provider on file. for the opportunity to be involved in this patient's care.